# Patient Record
Sex: FEMALE | Race: WHITE | NOT HISPANIC OR LATINO | Employment: OTHER | URBAN - METROPOLITAN AREA
[De-identification: names, ages, dates, MRNs, and addresses within clinical notes are randomized per-mention and may not be internally consistent; named-entity substitution may affect disease eponyms.]

---

## 2017-05-12 ENCOUNTER — HOSPITAL ENCOUNTER (EMERGENCY)
Facility: HOSPITAL | Age: 47
Discharge: HOME | End: 2017-05-12
Attending: EMERGENCY MEDICINE
Payer: COMMERCIAL

## 2017-05-12 VITALS
BODY MASS INDEX: 21.45 KG/M2 | HEIGHT: 66 IN | OXYGEN SATURATION: 100 % | TEMPERATURE: 99 F | SYSTOLIC BLOOD PRESSURE: 112 MMHG | DIASTOLIC BLOOD PRESSURE: 62 MMHG | RESPIRATION RATE: 18 BRPM | HEART RATE: 92 BPM | WEIGHT: 133.5 LBS

## 2017-05-12 DIAGNOSIS — G89.4 CHRONIC PAIN SYNDROME: ICD-10-CM

## 2017-05-12 DIAGNOSIS — N39.0 URINARY TRACT INFECTION WITHOUT HEMATURIA, SITE UNSPECIFIED: Primary | ICD-10-CM

## 2017-05-12 LAB
ALBUMIN SERPL BCP-MCNC: 3.7 G/DL
ALP SERPL-CCNC: 96 U/L
ALT SERPL W/O P-5'-P-CCNC: <5 U/L
ANION GAP SERPL CALC-SCNC: 11 MMOL/L
AST SERPL-CCNC: 7 U/L
BACTERIA #/AREA URNS HPF: ABNORMAL /HPF
BASOPHILS # BLD AUTO: 0.02 K/UL
BASOPHILS NFR BLD: 0.3 %
BILIRUB SERPL-MCNC: 0.5 MG/DL
BILIRUB UR QL STRIP: ABNORMAL
BUN SERPL-MCNC: 4 MG/DL
CALCIUM SERPL-MCNC: 9.9 MG/DL
CHLORIDE SERPL-SCNC: 105 MMOL/L
CLARITY UR: CLEAR
CO2 SERPL-SCNC: 23 MMOL/L
COLOR UR: YELLOW
CREAT SERPL-MCNC: 0.7 MG/DL
DIFFERENTIAL METHOD: ABNORMAL
EOSINOPHIL # BLD AUTO: 0 K/UL
EOSINOPHIL NFR BLD: 0.4 %
ERYTHROCYTE [DISTWIDTH] IN BLOOD BY AUTOMATED COUNT: 13.3 %
EST. GFR  (AFRICAN AMERICAN): >60 ML/MIN/1.73 M^2
EST. GFR  (NON AFRICAN AMERICAN): >60 ML/MIN/1.73 M^2
GLUCOSE SERPL-MCNC: 129 MG/DL
GLUCOSE UR QL STRIP: NEGATIVE
HCT VFR BLD AUTO: 32.9 %
HGB BLD-MCNC: 11.4 G/DL
HGB UR QL STRIP: ABNORMAL
KETONES UR QL STRIP: NEGATIVE
LEUKOCYTE ESTERASE UR QL STRIP: ABNORMAL
LYMPHOCYTES # BLD AUTO: 0.9 K/UL
LYMPHOCYTES NFR BLD: 11.1 %
MCH RBC QN AUTO: 32.9 PG
MCHC RBC AUTO-ENTMCNC: 34.7 %
MCV RBC AUTO: 95 FL
MICROSCOPIC COMMENT: ABNORMAL
MONOCYTES # BLD AUTO: 0.7 K/UL
MONOCYTES NFR BLD: 8.4 %
NEUTROPHILS # BLD AUTO: 6.3 K/UL
NEUTROPHILS NFR BLD: 79.8 %
NITRITE UR QL STRIP: POSITIVE
PH UR STRIP: 6 [PH] (ref 5–8)
PLATELET # BLD AUTO: 245 K/UL
PMV BLD AUTO: 11.1 FL
POTASSIUM SERPL-SCNC: 3.4 MMOL/L
PROT SERPL-MCNC: 6.6 G/DL
PROT UR QL STRIP: ABNORMAL
RBC # BLD AUTO: 3.47 M/UL
RBC #/AREA URNS HPF: 6 /HPF (ref 0–4)
SODIUM SERPL-SCNC: 139 MMOL/L
SP GR UR STRIP: 1.01 (ref 1–1.03)
URN SPEC COLLECT METH UR: ABNORMAL
UROBILINOGEN UR STRIP-ACNC: NEGATIVE EU/DL
WBC # BLD AUTO: 7.83 K/UL
WBC #/AREA URNS HPF: 5 /HPF (ref 0–5)

## 2017-05-12 PROCEDURE — 96375 TX/PRO/DX INJ NEW DRUG ADDON: CPT

## 2017-05-12 PROCEDURE — 87186 SC STD MICRODIL/AGAR DIL: CPT

## 2017-05-12 PROCEDURE — 81000 URINALYSIS NONAUTO W/SCOPE: CPT

## 2017-05-12 PROCEDURE — 87086 URINE CULTURE/COLONY COUNT: CPT

## 2017-05-12 PROCEDURE — 63600175 PHARM REV CODE 636 W HCPCS: Performed by: EMERGENCY MEDICINE

## 2017-05-12 PROCEDURE — 80053 COMPREHEN METABOLIC PANEL: CPT

## 2017-05-12 PROCEDURE — 87077 CULTURE AEROBIC IDENTIFY: CPT

## 2017-05-12 PROCEDURE — 99284 EMERGENCY DEPT VISIT MOD MDM: CPT | Mod: 25

## 2017-05-12 PROCEDURE — 96365 THER/PROPH/DIAG IV INF INIT: CPT

## 2017-05-12 PROCEDURE — 85025 COMPLETE CBC W/AUTO DIFF WBC: CPT

## 2017-05-12 PROCEDURE — 87088 URINE BACTERIA CULTURE: CPT

## 2017-05-12 RX ORDER — CEFUROXIME AXETIL 500 MG/1
500 TABLET ORAL 2 TIMES DAILY
Qty: 14 TABLET | Refills: 0 | Status: SHIPPED | OUTPATIENT
Start: 2017-05-12 | End: 2017-05-19

## 2017-05-12 RX ORDER — TRAMADOL HYDROCHLORIDE 50 MG/1
50 TABLET ORAL EVERY 6 HOURS PRN
Qty: 12 TABLET | Refills: 0 | Status: SHIPPED | OUTPATIENT
Start: 2017-05-12 | End: 2017-05-22

## 2017-05-12 RX ORDER — MORPHINE SULFATE 4 MG/ML
4 INJECTION, SOLUTION INTRAMUSCULAR; INTRAVENOUS
Status: COMPLETED | OUTPATIENT
Start: 2017-05-12 | End: 2017-05-12

## 2017-05-12 RX ORDER — ONDANSETRON 2 MG/ML
4 INJECTION INTRAMUSCULAR; INTRAVENOUS
Status: COMPLETED | OUTPATIENT
Start: 2017-05-12 | End: 2017-05-12

## 2017-05-12 RX ADMIN — MORPHINE SULFATE 4 MG: 4 INJECTION, SOLUTION INTRAMUSCULAR; INTRAVENOUS at 12:05

## 2017-05-12 RX ADMIN — CEFTRIAXONE 1 G: 1 INJECTION, SOLUTION INTRAVENOUS at 12:05

## 2017-05-12 RX ADMIN — ONDANSETRON 4 MG: 2 INJECTION INTRAMUSCULAR; INTRAVENOUS at 12:05

## 2017-05-12 NOTE — ED PROVIDER NOTES
SCRIBE #1 NOTE: I, Cece Argueta, am scribing for, and in the presence of, Radha Schaefer MD. I have scribed the entire note.      History      Chief Complaint   Patient presents with    Flank Pain     R sided flank and upper quadrant abd pain x 4 days        Review of patient's allergies indicates:   Allergen Reactions    Prozac [fluoxetine] Other (See Comments)     Pt states she is intolerant of all antidepressants-makes her forget who or where she is    Adhesive tape-silicones Dermatitis     Skin sluffs off    Powder scent fragrance      Powder from gloves        HPI   HPI    5/12/2017, 12:29 PM   History obtained from the patient      History of Present Illness: Mary Jo Chan is a 46 y.o. female patient who presents to the Emergency Department for R sided flank pain associated with RUQ abd pain which onset gradually 4 days ago. Symptoms are constant and moderate in severity. Pt reports that her R side is tender to touch. Pt reports that when she lays on her L side the pain is unbearable so she lays on her R side to try to get comfortable when she sleeps. No other associated sxs reported. Patient denies any HA, fever, nausea, vomiting, diarrhea, vaginal bleeding, vaginal discharge, dysuria, hematochezia, hematuria, and all other sxs at this time. Pt reports that she had dysuria a few days ago but reports sxs have resolved. Pt reports that she PSHx appendectomy, cholecystectomy, and hysterectomy. No further complaints or concerns at this time.     Arrival mode: Personal vehicle     PCP: Levi Guzman MD       Past Medical History:  Past Medical History:   Diagnosis Date    Anxiety     with severe panic attacks    Chronic fatigue     Depression     Disc degeneration     Disc degeneration     Diverticulosis     Herniated disc     IBS (irritable bowel syndrome)     IgG deficiency     IgG deficiency     MS (multiple sclerosis)     Multiple sclerosis     Osteoarthritis     Other chronic  sinusitis     Panic attack        Past Surgical History:  Past Surgical History:   Procedure Laterality Date    BLADDER SURGERY      multiple repairs after tears during ex lap     SECTION, LOW TRANSVERSE      CYSTOTOMY      with repair at time of     FOOT NEUROMA SURGERY      HYSTERECTOMY      MT EXPLORATORY OF ABDOMEN      Multiple with lysis of adhesions    SHOULDER SURGERY      right         Family History:  Family History   Problem Relation Age of Onset    Colon cancer Mother 30       Social History:  Social History     Social History Main Topics    Smoking status: Current Some Day Smoker     Packs/day: 0.50     Years: 27.00     Types: Cigarettes    Smokeless tobacco: Not on file    Alcohol use No    Drug use: No    Sexual activity: Not on file       ROS   Review of Systems   Constitutional: Negative for chills and fever.   HENT: Negative for sore throat and trouble swallowing.    Respiratory: Negative for cough and shortness of breath.    Cardiovascular: Negative for chest pain.   Gastrointestinal: Positive for abdominal pain. Negative for constipation, diarrhea, nausea and vomiting.        (-) hematochezia   Genitourinary: Positive for flank pain (R sided). Negative for dysuria, frequency, hematuria, vaginal bleeding and vaginal discharge.   Musculoskeletal: Negative for back pain.   Skin: Negative for rash and wound.   Neurological: Negative for weakness, numbness and headaches.   Hematological: Does not bruise/bleed easily.   All other systems reviewed and are negative.    Physical Exam    Initial Vitals   BP Pulse Resp Temp SpO2   17 1136 17 1136 17 1136 17 1136 17 1136   119/70 106 18 98.6 °F (37 °C) 96 %      Physical Exam  Nursing Notes and Vital Signs Reviewed.  Constitutional: Patient is in no apparent distress. Awake and alert. Well-developed and well-nourished.  Head: Atraumatic. Normocephalic.  Eyes: PERRL. EOM intact. Conjunctivae are not  "pale. No scleral icterus.  ENT: Mucous membranes are moist. Oropharynx is clear and symmetric.    Neck: Supple. Full ROM. No lymphadenopathy.  Cardiovascular: Regular rate. Regular rhythm. No murmurs, rubs, or gallops. Distal pulses are 2+ and symmetric.  Pulmonary/Chest: No respiratory distress. Clear to auscultation bilaterally. No wheezing, rales, or rhonchi.  Abdominal: Soft and non-distended.  There is no tenderness.  No rebound, guarding, or rigidity. Good bowel sounds.  Genitourinary: Right CVA tenderness  Musculoskeletal: Moves all extremities. No obvious deformities. No edema. No calf tenderness.  Skin: Warm and dry.  Neurological:  Alert, awake, and appropriate.  Normal speech.  No acute focal neurological deficits are appreciated.  Psychiatric: Normal affect. Good eye contact. Appropriate in content.    ED Course    Procedures  ED Vital Signs:  Vitals:    05/12/17 1136 05/12/17 1145 05/12/17 1155 05/12/17 1200   BP: 119/70 109/68 108/67 102/72   Pulse: 106 100 97 94   Resp: 18 20 17 (!) 21   Temp: 98.6 °F (37 °C)      TempSrc: Oral      SpO2: 96% 98% 98% 97%   Weight: 60.6 kg (133 lb 8 oz)      Height: 5' 6" (1.676 m)       05/12/17 1224 05/12/17 1236 05/12/17 1237 05/12/17 1313   BP: 99/63 102/62  112/62   Pulse: 90 92  92   Resp: 16 16  18   Temp:   98.7 °F (37.1 °C) 98.7 °F (37.1 °C)   TempSrc:   Oral    SpO2: 97% 98%  100%   Weight:       Height:           Abnormal Lab Results:  Labs Reviewed   CBC W/ AUTO DIFFERENTIAL - Abnormal; Notable for the following:        Result Value    RBC 3.47 (*)     Hemoglobin 11.4 (*)     Hematocrit 32.9 (*)     MCH 32.9 (*)     Lymph # 0.9 (*)     Gran% 79.8 (*)     Lymph% 11.1 (*)     All other components within normal limits   COMPREHENSIVE METABOLIC PANEL - Abnormal; Notable for the following:     Potassium 3.4 (*)     Glucose 129 (*)     BUN, Bld 4 (*)     AST 7 (*)     ALT <5 (*)     All other components within normal limits   URINALYSIS - Abnormal; Notable for " the following:     Protein, UA Trace (*)     Bilirubin (UA) 1+ (*)     Occult Blood UA 3+ (*)     Nitrite, UA Positive (*)     Leukocytes, UA 2+ (*)     All other components within normal limits   URINALYSIS MICROSCOPIC - Abnormal; Notable for the following:     RBC, UA 6 (*)     All other components within normal limits   CULTURE, URINE   CULTURE, URINE        All Lab Results:  Results for orders placed or performed during the hospital encounter of 05/12/17   CBC auto differential   Result Value Ref Range    WBC 7.83 3.90 - 12.70 K/uL    RBC 3.47 (L) 4.00 - 5.40 M/uL    Hemoglobin 11.4 (L) 12.0 - 16.0 g/dL    Hematocrit 32.9 (L) 37.0 - 48.5 %    MCV 95 82 - 98 fL    MCH 32.9 (H) 27.0 - 31.0 pg    MCHC 34.7 32.0 - 36.0 %    RDW 13.3 11.5 - 14.5 %    Platelets 245 150 - 350 K/uL    MPV 11.1 9.2 - 12.9 fL    Gran # 6.3 1.8 - 7.7 K/uL    Lymph # 0.9 (L) 1.0 - 4.8 K/uL    Mono # 0.7 0.3 - 1.0 K/uL    Eos # 0.0 0.0 - 0.5 K/uL    Baso # 0.02 0.00 - 0.20 K/uL    Gran% 79.8 (H) 38.0 - 73.0 %    Lymph% 11.1 (L) 18.0 - 48.0 %    Mono% 8.4 4.0 - 15.0 %    Eosinophil% 0.4 0.0 - 8.0 %    Basophil% 0.3 0.0 - 1.9 %    Differential Method Automated    Comprehensive metabolic panel   Result Value Ref Range    Sodium 139 136 - 145 mmol/L    Potassium 3.4 (L) 3.5 - 5.1 mmol/L    Chloride 105 95 - 110 mmol/L    CO2 23 23 - 29 mmol/L    Glucose 129 (H) 70 - 110 mg/dL    BUN, Bld 4 (L) 6 - 20 mg/dL    Creatinine 0.7 0.5 - 1.4 mg/dL    Calcium 9.9 8.7 - 10.5 mg/dL    Total Protein 6.6 6.0 - 8.4 g/dL    Albumin 3.7 3.5 - 5.2 g/dL    Total Bilirubin 0.5 0.1 - 1.0 mg/dL    Alkaline Phosphatase 96 55 - 135 U/L    AST 7 (L) 10 - 40 U/L    ALT <5 (L) 10 - 44 U/L    Anion Gap 11 8 - 16 mmol/L    eGFR if African American >60 >60 mL/min/1.73 m^2    eGFR if non African American >60 >60 mL/min/1.73 m^2   Urinalysis Clean Catch   Result Value Ref Range    Specimen UA Urine, Clean Catch     Color, UA Yellow Yellow, Straw, Acacia    Appearance, UA Clear  Clear    pH, UA 6.0 5.0 - 8.0    Specific Gravity, UA 1.010 1.005 - 1.030    Protein, UA Trace (A) Negative    Glucose, UA Negative Negative    Ketones, UA Negative Negative    Bilirubin (UA) 1+ (A) Negative    Occult Blood UA 3+ (A) Negative    Nitrite, UA Positive (A) Negative    Urobilinogen, UA Negative <2.0 EU/dL    Leukocytes, UA 2+ (A) Negative   Urinalysis Microscopic   Result Value Ref Range    RBC, UA 6 (H) 0 - 4 /hpf    WBC, UA 5 0 - 5 /hpf    Bacteria, UA Occasional None-Occ /hpf    Microscopic Comment SEE COMMENT          Imaging Results:  Imaging Results         CT Renal Stone Study ABD Pelvis WO (Final result) Result time:  05/12/17 12:24:38    Final result by Lilliana Hanson III, MD (05/12/17 12:24:38)    Impression:           1.  No evidence of urolithiasis, hydronephrosis or other acute disease.        Electronically signed by: LILLIANA HANSON MD  Date:     05/12/17  Time:    12:24     Narrative:    CT RENAL STONE STUDY ABD PELVIS WO    Clinical Indication: right flank pain.      Technique:  Axial images through the abdomen and pelvis were obtained without IV contrast. All CT scans at this facility use dose modulation, iterative reconstruction, and/or weight based dosing when appropriate to reduce radiation dose to as low as reasonably achievable.    Comparison: prior abdominal CT 08/20/2013    FINDINGS: No acute disease is seen in the lung bases. No acute osseous abnormality or suspicious bone marrow lesion identified.       The kidneys and ureters are unremarkable for noncontrast technique without evidence of urolithiasis or obstructive uropathy. The bladder is within normal limits.    There is a moderate amount of retained stool and gas scattered throughout the colon.  There is no evidence of bowel obstruction, acute inflammatory process or other acute bowel pathology. The stomach is within normal limits.  The appendix is surgically absent.  The liver, pancreas, adrenals and spleen are unremarkable  for noncontrast technique. Cholecystectomy changes are noted. No aortic aneurysm identified. No intra-abdominal or intrapelvic inflammatory changes are demonstrated. No free intraperitoneal fluid or free air identified. No pathologically enlarged lymph nodes are identified.                      The Emergency Provider reviewed the vital signs and test results, which are outlined above.    ED Discussion   12:40 PM: Discussed with pt all pertinent ED information and results. Discussed pt dx and plan of tx. Gave pt all f/u and return to the ED instructions. All questions and concerns were addressed at this time. Pt expresses understanding of information and instructions, and is comfortable with plan to discharge. Pt is stable for discharge.    I discussed with patient and/or family/caretaker that evaluation in the ED does not suggest any emergent or life threatening medical conditions requiring immediate intervention beyond what was provided in the ED, and I believe patient is safe for discharge.  Regardless, an unremarkable evaluation in the ED does not preclude the development or presence of a serious of life threatening condition. As such, patient was instructed to return immediately for any worsening or change in current symptoms.        ED Medication(s):  Medications   morphine injection 4 mg (4 mg Intravenous Given 5/12/17 1220)   ondansetron injection 4 mg (4 mg Intravenous Given 5/12/17 1220)   cefTRIAXone (ROCEPHIN) 1 g in dextrose 5 % 50 mL IVPB (0 g Intravenous Stopped 5/12/17 1313)       Discharge Medication List as of 5/12/2017 12:38 PM      START taking these medications    Details   cefUROXime (CEFTIN) 500 MG tablet Take 1 tablet (500 mg total) by mouth 2 (two) times daily., Starting 5/12/2017, Until Fri 5/19/17, Print      tramadol (ULTRAM) 50 mg tablet Take 1 tablet (50 mg total) by mouth every 6 (six) hours as needed for Pain., Starting 5/12/2017, Until Mon 5/22/17, Print             Follow-up  Information     Follow up with Levi Guzman MD. Schedule an appointment as soon as possible for a visit in 2 days.    Specialty:  Pediatrics    Why:  Return to the Emergency Room, If symptoms worsen    Contact information:    [address] [address]  [phone]              Medical Decision Making    Medical Decision Making:   Clinical Tests:   Lab Tests: Ordered and Reviewed  Radiological Study: Ordered and Reviewed           Scribe Attestation:   Scribe #1: I performed the above scribed service and the documentation accurately describes the services I performed. I attest to the accuracy of the note.    Attending:   Physician Attestation Statement for Scribe #1: I, Radha Schaefer MD, personally performed the services described in this documentation, as scribed by Cece Argueta, in my presence, and it is both accurate and complete.          Clinical Impression       ICD-10-CM ICD-9-CM   1. Urinary tract infection without hematuria, site unspecified N39.0 599.0   2. Chronic pain syndrome G89.4 338.4       Disposition:   Disposition: Discharged  Condition: Stable         Radha Schaefer MD  05/12/17 133

## 2017-05-12 NOTE — ED AVS SNAPSHOT
[facility] -   [address]  [place] [address]               April VIKTORIA Benderfariba   2017 11:38 AM   ED    Description:  Female : 1970   Department:  [facility] -            Your Care was Coordinated By:     Provider Role From To    Radha Schaefer MD Attending Provider 17 1143 --      Reason for Visit     Flank Pain           Diagnoses this Visit        Comments    Urinary tract infection without hematuria, site unspecified    -  Primary     Chronic pain syndrome           ED Disposition     None           To Do List           Follow-up Information     Follow up with Levi Guzman MD. Schedule an appointment as soon as possible for a visit in 2 days.    Specialty:  Pediatrics    Why:  Return to the Emergency Room, If symptoms worsen    Contact information:    [address] [place] [address]  [phone]         These Medications        Disp Refills Start End    cefUROXime (CEFTIN) 500 MG tablet 14 tablet 0 2017    Take 1 tablet (500 mg total) by mouth 2 (two) times daily. - Oral    Pharmacy: [facility] [address] [place] [address] Ph #: [phone]       tramadol (ULTRAM) 50 mg tablet 12 tablet 0 2017    Take 1 tablet (50 mg total) by mouth every 6 (six) hours as needed for Pain. - Oral    Pharmacy: [facility] [address] [place] [address] Ph #: [phone]         [facility]     [facility] -  Assistance  Unless otherwise directed by your provider, please contact [facility], our nurse care line that is available for 24/ assistance.     Registered nurses in the [facility] provide: appointment scheduling, clinical advisement, health education, and other advisory services.  Call: [phone] (toll free)               Medications           Message regarding Medications     Verify the  changes and/or additions to your medication regime listed below are the same as discussed with your clinician today.  If any of these changes or additions are incorrect, please notify your healthcare provider.        START taking these NEW medications        Refills    cefUROXime (CEFTIN) 500 MG tablet 0    Sig: Take 1 tablet (500 mg total) by mouth 2 (two) times daily.    Class: Print    Route: Oral    tramadol (ULTRAM) 50 mg tablet 0    Sig: Take 1 tablet (50 mg total) by mouth every 6 (six) hours as needed for Pain.    Class: Print    Route: Oral      These medications were administered today        Dose Freq    morphine injection 4 mg 4 mg ED 1 Time    Sig: Inject 1 mL (4 mg total) into the vein ED 1 Time.    Class: Normal    Route: Intravenous    ondansetron injection 4 mg 4 mg ED 1 Time    Sig: Inject 4 mg into the vein ED 1 Time.    Class: Normal    Route: Intravenous    cefTRIAXone (ROCEPHIN) 1 g in dextrose 5 % 50 mL IVPB 1 g ED 1 Time    Sig: Inject 50 mLs (1 g total) into the vein ED 1 Time.    Class: Normal    Route: Intravenous           Verify that the below list of medications is an accurate representation of the medications you are currently taking.  If none reported, the list may be blank. If incorrect, please contact your healthcare provider. Carry this list with you in case of emergency.           Current Medications     alendronate-vitamin D3 (FOSAMAX PLUS D) 70-2,800 mg-unit per tablet Take 1 tablet by mouth every 7 days.    alprazolam (XANAX XR) 2 MG Tb24     cefTRIAXone (ROCEPHIN) 1 g in dextrose 5 % 50 mL IVPB Inject 50 mLs (1 g total) into the vein ED 1 Time.    cefUROXime (CEFTIN) 500 MG tablet Take 1 tablet (500 mg total) by mouth 2 (two) times daily.    codeine-butalbital-ASA-caffeine 37--40 mg Cap Take 1 capsule by mouth every 4 (four) hours as needed.    dextroamphetamine-amphetamine 10 mg Tab Take by mouth.    dimethyl fumarate (TECFIDERA) 240 mg CpDR Take 240 mg by mouth 2 (two)  "times daily.    indomethacin (INDOCIN) 50 MG capsule Take 50 mg by mouth 3 (three) times daily.    lamotrigine (LAMICTAL) 100 MG tablet Take 100 mg by mouth once daily.    ondansetron (ZOFRAN-ODT) 8 MG TbDL     tramadol (ULTRAM) 50 mg tablet Take 1 tablet (50 mg total) by mouth every 6 (six) hours as needed for Pain.    zolpidem (AMBIEN) 10 mg Tab Take 5 mg by mouth nightly as needed.           Clinical Reference Information           Your Vitals Were     BP Pulse Temp Resp Height Weight    99/63 92 98.7 °F (37.1 °C) (Oral) 16 5' 6" (1.676 m) 60.6 kg (133 lb 8 oz)    SpO2 BMI             98% 21.55 kg/m2         Allergies as of 5/12/2017        Reactions    Prozac [Fluoxetine] Other (See Comments)    Pt states she is intolerant of all antidepressants-makes her forget who or where she is    Adhesive Tape-silicones Dermatitis    Skin sluffs off    Powder Scent Fragrance     Powder from gloves      Immunizations Administered on Date of Encounter - 5/12/2017     None      ED Micro, Lab, POCT     Start Ordered       Status Ordering Provider    05/12/17 1227 05/12/17 1226  Urine culture  Add-on      Completed     05/12/17 1157 05/12/17 1156  CBC auto differential  STAT      Final result     05/12/17 1157 05/12/17 1156  Comprehensive metabolic panel  STAT      In process     05/12/17 1157 05/12/17 1156  Urinalysis Clean Catch  STAT      Final result     05/12/17 1156 05/12/17 1156  Urinalysis Microscopic  Once      Final result       ED Imaging Orders     Start Ordered       Status Ordering Provider    05/12/17 1157 05/12/17 1156  CT Renal Stone Study ABD Pelvis WO  1 time imaging      Final result       Discharge References/Attachments     BLADDER INFECTION, FEMALE (ADULT) (ENGLISH)    URINARY TRACT INFECTIONS IN WOMEN (ENGLISH)       [facility] -  complies with applicable Federal civil rights laws and does not discriminate on the basis of race, color, national origin, age, disability, or sex.        Language " Assistance Services     ATTENTION: Language assistance services are available, free of charge. Please call [phone].      ATENCIÓN: Si habla español, tiene a henderson disposición servicios gratuitos de asistencia lingüística. Llame al [phone].     CHÚ Ý: N?u b?n nói Ti?ng Vi?t, có các d?ch v? h? tr? ngôn ng? mi?n phí dành cho b?n. G?i s? [phone].

## 2017-05-15 LAB — BACTERIA UR CULT: NORMAL

## 2018-12-29 ENCOUNTER — HOSPITAL ENCOUNTER (EMERGENCY)
Facility: HOSPITAL | Age: 48
Discharge: HOME | End: 2018-12-29
Attending: EMERGENCY MEDICINE
Payer: COMMERCIAL

## 2018-12-29 VITALS
RESPIRATION RATE: 18 BRPM | DIASTOLIC BLOOD PRESSURE: 88 MMHG | HEART RATE: 109 BPM | BODY MASS INDEX: 31.46 KG/M2 | OXYGEN SATURATION: 98 % | WEIGHT: 188.81 LBS | SYSTOLIC BLOOD PRESSURE: 149 MMHG | TEMPERATURE: 98 F | HEIGHT: 65 IN

## 2018-12-29 DIAGNOSIS — R21 RASH OF GENITAL AREA: Primary | ICD-10-CM

## 2018-12-29 PROCEDURE — 99283 EMERGENCY DEPT VISIT LOW MDM: CPT

## 2018-12-29 PROCEDURE — 87529 HSV DNA AMP PROBE: CPT

## 2018-12-29 RX ORDER — HYDROCODONE BITARTRATE AND ACETAMINOPHEN 5; 325 MG/1; MG/1
1 TABLET ORAL EVERY 4 HOURS PRN
Qty: 11 TABLET | Refills: 0 | Status: SHIPPED | OUTPATIENT
Start: 2018-12-29 | End: 2019-01-08

## 2018-12-29 RX ORDER — HYDROCODONE BITARTRATE AND ACETAMINOPHEN 5; 325 MG/1; MG/1
1 TABLET ORAL EVERY 4 HOURS PRN
Qty: 11 TABLET | Refills: 0 | Status: SHIPPED | OUTPATIENT
Start: 2018-12-29 | End: 2018-12-29 | Stop reason: SDUPTHER

## 2018-12-29 RX ORDER — VALACYCLOVIR HYDROCHLORIDE 1 G/1
1000 TABLET, FILM COATED ORAL 3 TIMES DAILY
Qty: 21 TABLET | Refills: 0 | Status: SHIPPED | OUTPATIENT
Start: 2018-12-29 | End: 2020-06-24

## 2018-12-29 NOTE — ED PROVIDER NOTES
SCRIBE #1 NOTE: I, Corinne Mack, am scribing for, and in the presence of, Krystian Laureano MD. I have scribed the entire note.      History      Chief Complaint   Patient presents with    Groin Swelling     reports to left side vagina x 2 days       Review of patient's allergies indicates:   Allergen Reactions    Prozac [fluoxetine] Other (See Comments)     Pt states she is intolerant of all antidepressants-makes her forget who or where she is    Adhesive tape-silicones Dermatitis     Skin sluffs off    Powder scent fragrance      Powder from gloves        HPI   HPI    2018, 2:19 PM   History obtained from the patient      History of Present Illness: April VIKTORIA Chan is a 47 y.o. female patient with PMHx of anxiety and MS who presents to the Emergency Department for L sided vaginal swelling which onset gradually 2 days ago. Symptoms are constant and moderate in severity. No mitigating or exacerbating factors reported. Associated sxs include vaginal pain. Patient denies any fever, chills, cough, N/V, abd pain, back pain, vaginal d/c, vaginal bleeding, pelvic pain, dysuria, hematuria, HA, and all other sxs at this time. No prior Tx reported. No further complaints or concerns at this time.         Arrival mode: Personal vehicle    PCP: Levi Guzman MD       Past Medical History:  Past Medical History:   Diagnosis Date    Anxiety     with severe panic attacks    Chronic fatigue     Depression     Disc degeneration     Disc degeneration     Diverticulosis     Herniated disc     IBS (irritable bowel syndrome)     IgG deficiency     IgG deficiency     MS (multiple sclerosis)     Multiple sclerosis     Osteoarthritis     Other chronic sinusitis     Panic attack        Past Surgical History:  Past Surgical History:   Procedure Laterality Date    BLADDER SURGERY      multiple repairs after tears during ex lap     SECTION, LOW TRANSVERSE      COLONOSCOPY N/A 2013    Performed by  [facility]    CYSTOTOMY      with repair at time of     EGD (ESOPHAGOGASTRODUODENOSCOPY) N/A 2013    Performed by Prakash Oliver [facility] at [facility]    FOOT NEUROMA SURGERY      HYSTERECTOMY      [place] EXPLORATORY OF ABDOMEN      Multiple with lysis of adhesions    SHOULDER SURGERY      right         Family History:  Family History   Problem Relation Age of Onset    Colon cancer Mother 30       Social History:  Social History     Tobacco Use    Smoking status: Current Some Day Smoker     Packs/day: 0.50     Years: 27.00     Pack years: 13.50     Types: Cigarettes   Substance and Sexual Activity    Alcohol use: No    Drug use: No    Sexual activity: Unknown       ROS   Review of Systems   Constitutional: Negative for chills and fever.   Respiratory: Negative for cough and shortness of breath.    Cardiovascular: Negative for chest pain and leg swelling.   Gastrointestinal: Negative for abdominal pain, diarrhea, nausea and vomiting.   Genitourinary: Positive for vaginal pain. Negative for dysuria, hematuria, pelvic pain, vaginal bleeding and vaginal discharge.        (+) vaginal swelling   Musculoskeletal: Negative for back pain, neck pain and neck stiffness.   Skin: Negative for rash and wound.   Neurological: Negative for dizziness, light-headedness, numbness and headaches.   All other systems reviewed and are negative.    Physical Exam      Initial Vitals [18 1415]   BP Pulse Resp Temp SpO2   (!) 149/88 109 18 98 °F (36.7 °C) 98 %      MAP       --          Physical Exam  Nursing Notes and Vital Signs Reviewed.  Constitutional: Patient is in no acute distress. Well-developed and well-nourished.  Head: Atraumatic. Normocephalic.  Eyes: PERRL. EOM intact. Conjunctivae are not pale. No scleral icterus.  ENT: Mucous membranes are moist. Oropharynx is clear and symmetric.    Neck: Supple. Full ROM. No lymphadenopathy.  Cardiovascular: Regular rate. Regular rhythm. No  "murmurs, rubs, or gallops. Distal pulses are 2+ and symmetric.  Pulmonary/Chest: No respiratory distress. Clear to auscultation bilaterally. No wheezing or rales.  Abdominal: Soft and non-distended.  There is no tenderness.  No rebound, guarding, or rigidity.   Pelvic: A female chaperone was present for this examination. Ulcers with an erythematous base noted to the L vulva and L inner thigh. Cervical os is closed. There is no CMT. There is no blood in the vaginal vault. No discharge. No adnexal tenderness. No adnexal masses.  Musculoskeletal: Moves all extremities. No obvious deformities. No edema.   Skin: Warm and dry.  Neurological:  Alert, awake, and appropriate.  Normal speech.  No acute focal neurological deficits are appreciated.  Psychiatric: Normal affect. Good eye contact. Appropriate in content.    ED Course    Procedures  ED Vital Signs:  Vitals:    12/29/18 1415   BP: (!) 149/88   Pulse: 109   Resp: 18   Temp: 98 °F (36.7 °C)   TempSrc: Oral   SpO2: 98%   Weight: 85.6 kg (188 lb 13.2 oz)   Height: 5' 5" (1.651 m)       Abnormal Lab Results:  Labs Reviewed   HERPES SIMPLEX (HSV) BY RAPID PCR, NON-BLOOD        All Lab Results:  Results for orders placed or performed during the hospital encounter of 05/12/17   Urine culture   Result Value Ref Range    Urine Culture, Routine ESCHERICHIA COLI  >100,000 cfu/ml          Susceptibility    Escherichia coli - CULTURE, URINE     Amp/Sulbactam <=8/4 Sensitive mcg/mL     Ampicillin <=8 Sensitive mcg/mL     Amox/K Clav'ate <=8/4 Sensitive mcg/mL     Ceftriaxone <=8 Sensitive mcg/mL     Cefazolin <=8 Sensitive mcg/mL     Ciprofloxacin <=1 Sensitive mcg/mL     Cefepime <=8 Sensitive mcg/mL     Ertapenem <=2 Sensitive mcg/mL     Nitrofurantoin <=32 Sensitive mcg/mL     Gentamicin <=4 Sensitive mcg/mL     Meropenem <=4 Sensitive mcg/mL     Piperacillin/Tazo <=16 Sensitive mcg/mL     Trimeth/Sulfa <=2/38 Sensitive mcg/mL     Tetracycline <=4 Sensitive mcg/mL     " Tobramycin <=4 Sensitive mcg/mL   CBC auto differential   Result Value Ref Range    WBC 7.83 3.90 - 12.70 K/uL    RBC 3.47 (L) 4.00 - 5.40 M/uL    Hemoglobin 11.4 (L) 12.0 - 16.0 g/dL    Hematocrit 32.9 (L) 37.0 - 48.5 %    MCV 95 82 - 98 fL    MCH 32.9 (H) 27.0 - 31.0 pg    MCHC 34.7 32.0 - 36.0 %    RDW 13.3 11.5 - 14.5 %    Platelets 245 150 - 350 K/uL    MPV 11.1 9.2 - 12.9 fL    Gran # (ANC) 6.3 1.8 - 7.7 K/uL    Lymph # 0.9 (L) 1.0 - 4.8 K/uL    Mono # 0.7 0.3 - 1.0 K/uL    Eos # 0.0 0.0 - 0.5 K/uL    Baso # 0.02 0.00 - 0.20 K/uL    Gran% 79.8 (H) 38.0 - 73.0 %    Lymph% 11.1 (L) 18.0 - 48.0 %    Mono% 8.4 4.0 - 15.0 %    Eosinophil% 0.4 0.0 - 8.0 %    Basophil% 0.3 0.0 - 1.9 %    Differential Method Automated    Comprehensive metabolic panel   Result Value Ref Range    Sodium 139 136 - 145 mmol/L    Potassium 3.4 (L) 3.5 - 5.1 mmol/L    Chloride 105 95 - 110 mmol/L    CO2 23 23 - 29 mmol/L    Glucose 129 (H) 70 - 110 mg/dL    BUN, Bld 4 (L) 6 - 20 mg/dL    Creatinine 0.7 0.5 - 1.4 mg/dL    Calcium 9.9 8.7 - 10.5 mg/dL    Total Protein 6.6 6.0 - 8.4 g/dL    Albumin 3.7 3.5 - 5.2 g/dL    Total Bilirubin 0.5 0.1 - 1.0 mg/dL    Alkaline Phosphatase 96 55 - 135 U/L    AST 7 (L) 10 - 40 U/L    ALT <5 (L) 10 - 44 U/L    Anion Gap 11 8 - 16 mmol/L    eGFR if African American >60 >60 mL/min/1.73 m^2    eGFR if non African American >60 >60 mL/min/1.73 m^2   Urinalysis Clean Catch   Result Value Ref Range    Specimen UA Urine, Clean Catch     Color, UA Yellow Yellow, Straw, Acacia    Appearance, UA Clear Clear    pH, UA 6.0 5.0 - 8.0    Specific Gravity, UA 1.010 1.005 - 1.030    Protein, UA Trace (A) Negative    Glucose, UA Negative Negative    Ketones, UA Negative Negative    Bilirubin (UA) 1+ (A) Negative    Occult Blood UA 3+ (A) Negative    Nitrite, UA Positive (A) Negative    Urobilinogen, UA Negative <2.0 EU/dL    Leukocytes, UA 2+ (A) Negative   Urinalysis Microscopic   Result Value Ref Range    RBC, UA 6 (H) 0 -  4 /hpf    WBC, UA 5 0 - 5 /hpf    Bacteria, UA Occasional None-Occ /hpf    Microscopic Comment SEE COMMENT        Imaging Results:  Imaging Results    None                 The Emergency Provider reviewed the vital signs and test results, which are outlined above.    ED Discussion     3:08 PM: Reassessed pt at this time. Pt is awake, alert, and in no distress. Discussed with pt all pertinent ED information and results. Discussed pt dx and plan of tx. Gave pt all f/u and return to the ED instructions. All questions and concerns were addressed at this time. Pt expresses understanding of information and instructions, and is comfortable with plan to discharge. Pt is stable for discharge.    I discussed with patient and/or family/caretaker that evaluation in the ED does not suggest any emergent or life threatening medical conditions requiring immediate intervention beyond what was provided in the ED, and I believe patient is safe for discharge.  Regardless, an unremarkable evaluation in the ED does not preclude the development or presence of a serious of life threatening condition. As such, patient was instructed to return immediately for any worsening or change in current symptoms.      ED Medication(s):  Medications - No data to display       Medication List      START taking these medications    valACYclovir 1000 MG tablet  Commonly known as:  VALTREX  Take 1 tablet (1,000 mg total) by mouth 3 (three) times daily. for 7 days        ASK your doctor about these medications    alendronate-vitamin D3 70 mg- 2,800 unit per tablet  Commonly known as:  FOSAMAX PLUS D     ALPRAZolam 2 MG Tb24  Commonly known as:  XANAX XR     codeine-butalbital-ASA-caffeine 02--40 mg Cap  Commonly known as:  BUTALBITAL COMPOUND W/CODEINE     dextroamphetamine-amphetamine 10 mg Tab     indomethacin 50 MG capsule  Commonly known as:  INDOCIN     lamoTRIgine 100 MG tablet  Commonly known as:  LAMICTAL     ondansetron 8 MG Tbdl  Commonly  known as:  ZOFRAN-ODT     TECFIDERA 240 mg Cpdr  Generic drug:  dimethyl fumarate     zolpidem 10 mg Tab  Commonly known as:  AMBIEN           Where to Get Your Medications      You can get these medications from any pharmacy    Bring a paper prescription for each of these medications  · valACYclovir 1000 MG tablet         Follow-up Information     Levi Guzman MD.    Specialty:  Pediatrics  Contact information:  [id number] [place] [id number] [phone]                     Medical Decision Making    Medical Decision Making:   Clinical Tests:   Lab Tests: Ordered and Reviewed     Additional MDM:   Smoking Cessation: The patient is a smoker. The patient was counseled on smoking cessation for: 3 minutes. The patient was counseled on tobacco related  health complications.        Scribe Attestation:   Scribe #1: I performed the above scribed service and the documentation accurately describes the services I performed. I attest to the accuracy of the note 12/29/2018 3:08 PM.    Attending:   Physician Attestation Statement for Scribe #1: I, Krystian Laureano MD, personally performed the services described in this documentation, as scribed by Corinne Mack, in my presence, and it is both accurate and complete.          Clinical Impression       ICD-10-CM ICD-9-CM   1. Rash of genital area R21 782.1       Disposition:   Disposition: Discharged  Condition: Stable           Krystian Laureano MD  12/29/18 1523

## 2019-01-02 LAB
HSV1 DNA SPEC QL NAA+PROBE: NEGATIVE
HSV2 DNA SPEC QL NAA+PROBE: NEGATIVE
SPECIMEN SOURCE: NORMAL

## 2019-08-27 ENCOUNTER — HOSPITAL ENCOUNTER (EMERGENCY)
Facility: HOSPITAL | Age: 49
Discharge: HOME | End: 2019-08-27
Attending: EMERGENCY MEDICINE
Payer: COMMERCIAL

## 2019-08-27 VITALS
DIASTOLIC BLOOD PRESSURE: 77 MMHG | OXYGEN SATURATION: 97 % | BODY MASS INDEX: 32.26 KG/M2 | SYSTOLIC BLOOD PRESSURE: 147 MMHG | WEIGHT: 200.75 LBS | TEMPERATURE: 98 F | HEIGHT: 66 IN | RESPIRATION RATE: 18 BRPM | HEART RATE: 88 BPM

## 2019-08-27 DIAGNOSIS — S39.012A STRAIN OF LUMBAR REGION, INITIAL ENCOUNTER: ICD-10-CM

## 2019-08-27 DIAGNOSIS — S60.052A CONTUSION OF LEFT LITTLE FINGER WITHOUT DAMAGE TO NAIL, INITIAL ENCOUNTER: Primary | ICD-10-CM

## 2019-08-27 PROCEDURE — 99284 EMERGENCY DEPT VISIT MOD MDM: CPT | Mod: 25

## 2019-08-27 PROCEDURE — 29130 APPL FINGER SPLINT STATIC: CPT | Mod: F1

## 2019-08-27 NOTE — ED PROVIDER NOTES
Encounter Date: 2019       History     Chief Complaint   Patient presents with    Fall     tripped over dog, c/o left pinky finger, back, left elbow, and left knee pain     48 year old female presents to the er for evaluation of lower back pain and left hand pain after fall PTA. Pt reports she was getting out of the recliner and stepped on her cats tail which startled her dog and made her fall. Pt reports landing on her left side causing pain to her lower back and pinky finger left hand.  Pt denies hitting head or loc. Pt reports pain is mild and constant and states pain is an aching pain. Pt denies fever, chest pain, shortness of breath, chills, abdominal pain, dysuria, saddle anesthesia, incontenice     The history is provided by the patient.     Review of patient's allergies indicates:   Allergen Reactions    Prozac [fluoxetine] Other (See Comments)     Pt states she is intolerant of all antidepressants-makes her forget who or where she is    Adhesive tape-silicones Dermatitis     Skin sluffs off    Powder scent fragrance      Powder from gloves     Past Medical History:   Diagnosis Date    Anxiety     with severe panic attacks    Chronic fatigue     Depression     Disc degeneration     Disc degeneration     Diverticulosis     Herniated disc     IBS (irritable bowel syndrome)     IgG deficiency     IgG deficiency     MS (multiple sclerosis)     Multiple sclerosis     Osteoarthritis     Other chronic sinusitis     Panic attack      Past Surgical History:   Procedure Laterality Date    BLADDER SURGERY      multiple repairs after tears during ex lap     SECTION, LOW TRANSVERSE      COLONOSCOPY N/A 2013    [facility]    CYSTOTOMY      with repair at time of     EGD (ESOPHAGOGASTRODUODENOSCOPY) N/A 2013    Performed by Prakash Oliver [facility] at [facility]O    FOOT NEUROMA SURGERY      HYSTERECTOMY      [place] EXPLORATORY OF ABDOMEN       Multiple with lysis of adhesions    SHOULDER SURGERY      right     Family History   Problem Relation Age of Onset    Colon cancer Mother 30     Social History     Tobacco Use    Smoking status: Current Some Day Smoker     Packs/day: 0.50     Years: 27.00     Pack years: 13.50     Types: Cigarettes   Substance Use Topics    Alcohol use: No    Drug use: No     Review of Systems   Constitutional: Negative for chills, fatigue and fever.   HENT: Negative for sore throat.    Respiratory: Negative for shortness of breath.    Cardiovascular: Negative for chest pain.   Gastrointestinal: Negative for nausea.   Genitourinary: Negative for dysuria.   Musculoskeletal: Positive for arthralgias and myalgias. Negative for back pain and gait problem.   Skin: Negative for rash.   Neurological: Negative for dizziness, weakness and light-headedness.   Hematological: Does not bruise/bleed easily.   Psychiatric/Behavioral: Negative for agitation.       Physical Exam     Initial Vitals [08/27/19 0834]   BP Pulse Resp Temp SpO2   (!) 147/77 88 18 98.4 °F (36.9 °C) 97 %      MAP       --         Physical Exam    Constitutional: She appears well-developed and well-nourished. She is not diaphoretic. No distress.   HENT:   Head: Normocephalic and atraumatic.   Right Ear: External ear normal.   Left Ear: External ear normal.   Mouth/Throat: Oropharynx is clear and moist.   Eyes: Pupils are equal, round, and reactive to light. Right eye exhibits no discharge. Left eye exhibits no discharge.   Neck: Normal range of motion. Neck supple.   Cardiovascular: Normal rate and regular rhythm.   Pulmonary/Chest: Breath sounds normal. No respiratory distress. She has no wheezes. She has no rhonchi. She has no rales.   Abdominal: Soft. Bowel sounds are normal. There is no tenderness.   Musculoskeletal: She exhibits tenderness (left lower lumbar paraspinal TTP. no bony tenderness. left hand fifth digit mild swelling. pt with slightly decreased range  "of motion. hand neurovascuarly intact. radial and ulnar pulses 2+.).   Pt able to ambulate without difficulty    Neurological: She is alert and oriented to person, place, and time. She has normal strength.   Skin: Skin is warm and dry. No rash noted. No erythema.         ED Course   Splint Application  Date/Time: 8/27/2019 9:19 AM  Performed by: Toni Zhou NP  Authorized by: Krystian Laureano MD   Consent Done: Yes  Consent: Verbal consent obtained.  Risks and benefits: risks, benefits and alternatives were discussed  Consent given by: patient  Patient understanding: patient states understanding of the procedure being performed  Patient consent: the patient's understanding of the procedure matches consent given  Patient identity confirmed: name  Time out: Immediately prior to procedure a "time out" was called to verify the correct patient, procedure, equipment, support staff and site/side marked as required.  Location details: left index finger  Splint type: static finger  Supplies used: aluminum splint  Post-procedure: The splinted body part was neurovascularly unchanged following the procedure.  Patient tolerance: Patient tolerated the procedure well with no immediate complications        Labs Reviewed - No data to display       Imaging Results          X-Ray Lumbar Spine Ap And Lateral (Final result)  Result time 08/27/19 09:04:41    Final result by Yousuf Campos MD (08/27/19 09:04:41)                 Impression:      The lumbar spine is intact and in good alignment with no acute findings demonstrated.      Electronically signed by: Yousuf Campos  Date:    08/27/2019  Time:    09:04             Narrative:    EXAMINATION:  XR LUMBAR SPINE AP AND LATERAL    CLINICAL HISTORY:  fall;    TECHNIQUE:  AP, lateral and spot images were performed of the lumbar spine.    COMPARISON:  January 4, 2016    FINDINGS:  The lumbar spine appears intact in good alignment no fracture or dislocation.  Some moderate " disc space narrowing at L5-S1 is present.                               X-Ray Hand 3 view Left (Final result)  Result time 08/27/19 09:06:22    Final result by Yousuf Campos MD (08/27/19 09:06:22)                 Impression:      Negative exam.      Electronically signed by: Yousuf Campos  Date:    08/27/2019  Time:    09:06             Narrative:    EXAMINATION:  XR HAND COMPLETE 3 VIEW LEFT    CLINICAL HISTORY:  fall;    TECHNIQUE:  Standard radiography performed.    COMPARISON:  None    FINDINGS:  Bone density and architecture are normal.  No acute findings.                                                      Clinical Impression:       ICD-10-CM ICD-9-CM   1. Contusion of left little finger without damage to nail, initial encounter S60.052A 923.3   2. Strain of lumbar region, initial encounter S39.012A 847.2                                Toni Zhou NP  08/27/19 4889

## 2020-01-31 ENCOUNTER — HOSPITAL ENCOUNTER (EMERGENCY)
Facility: HOSPITAL | Age: 50
Discharge: HOME | End: 2020-01-31
Attending: EMERGENCY MEDICINE
Payer: COMMERCIAL

## 2020-01-31 VITALS
BODY MASS INDEX: 28.48 KG/M2 | DIASTOLIC BLOOD PRESSURE: 66 MMHG | WEIGHT: 176.5 LBS | SYSTOLIC BLOOD PRESSURE: 111 MMHG | OXYGEN SATURATION: 100 % | HEART RATE: 80 BPM | RESPIRATION RATE: 20 BRPM | TEMPERATURE: 98 F

## 2020-01-31 DIAGNOSIS — R10.84 GENERALIZED ABDOMINAL PAIN: Primary | ICD-10-CM

## 2020-01-31 DIAGNOSIS — E87.6 HYPOKALEMIA: ICD-10-CM

## 2020-01-31 DIAGNOSIS — N30.01 ACUTE CYSTITIS WITH HEMATURIA: ICD-10-CM

## 2020-01-31 LAB
ALBUMIN SERPL BCP-MCNC: 4 G/DL (ref 3.5–5.2)
ALP SERPL-CCNC: 97 U/L (ref 55–135)
ALT SERPL W/O P-5'-P-CCNC: 8 U/L (ref 10–44)
ANION GAP SERPL CALC-SCNC: 13 MMOL/L (ref 8–16)
AST SERPL-CCNC: 9 U/L (ref 10–40)
BACTERIA #/AREA URNS HPF: ABNORMAL /HPF
BASOPHILS # BLD AUTO: 0.03 K/UL (ref 0–0.2)
BASOPHILS NFR BLD: 0.3 % (ref 0–1.9)
BILIRUB SERPL-MCNC: 0.7 MG/DL (ref 0.1–1)
BILIRUB UR QL STRIP: NEGATIVE
BUN SERPL-MCNC: 10 MG/DL (ref 6–20)
CALCIUM SERPL-MCNC: 10.2 MG/DL (ref 8.7–10.5)
CHLORIDE SERPL-SCNC: 103 MMOL/L (ref 95–110)
CLARITY UR: CLEAR
CO2 SERPL-SCNC: 23 MMOL/L (ref 23–29)
COLOR UR: YELLOW
CREAT SERPL-MCNC: 0.7 MG/DL (ref 0.5–1.4)
DIFFERENTIAL METHOD: ABNORMAL
EOSINOPHIL # BLD AUTO: 0 K/UL (ref 0–0.5)
EOSINOPHIL NFR BLD: 0.3 % (ref 0–8)
ERYTHROCYTE [DISTWIDTH] IN BLOOD BY AUTOMATED COUNT: 12.2 % (ref 11.5–14.5)
EST. GFR  (AFRICAN AMERICAN): >60 ML/MIN/1.73 M^2
EST. GFR  (NON AFRICAN AMERICAN): >60 ML/MIN/1.73 M^2
GLUCOSE SERPL-MCNC: 107 MG/DL (ref 70–110)
GLUCOSE UR QL STRIP: NEGATIVE
HCT VFR BLD AUTO: 37.2 % (ref 37–48.5)
HGB BLD-MCNC: 12.8 G/DL (ref 12–16)
HGB UR QL STRIP: ABNORMAL
IMM GRANULOCYTES # BLD AUTO: 0.09 K/UL (ref 0–0.04)
IMM GRANULOCYTES NFR BLD AUTO: 0.8 % (ref 0–0.5)
KETONES UR QL STRIP: ABNORMAL
LEUKOCYTE ESTERASE UR QL STRIP: ABNORMAL
LIPASE SERPL-CCNC: 3 U/L (ref 4–60)
LYMPHOCYTES # BLD AUTO: 0.6 K/UL (ref 1–4.8)
LYMPHOCYTES NFR BLD: 5.2 % (ref 18–48)
MCH RBC QN AUTO: 32.2 PG (ref 27–31)
MCHC RBC AUTO-ENTMCNC: 34.4 G/DL (ref 32–36)
MCV RBC AUTO: 94 FL (ref 82–98)
MICROSCOPIC COMMENT: ABNORMAL
MONOCYTES # BLD AUTO: 0.8 K/UL (ref 0.3–1)
MONOCYTES NFR BLD: 7.4 % (ref 4–15)
NEUTROPHILS # BLD AUTO: 9.8 K/UL (ref 1.8–7.7)
NEUTROPHILS NFR BLD: 86 % (ref 38–73)
NITRITE UR QL STRIP: POSITIVE
NRBC BLD-RTO: 0 /100 WBC
PH UR STRIP: 6 [PH] (ref 5–8)
PLATELET # BLD AUTO: 325 K/UL (ref 150–350)
PMV BLD AUTO: 12.1 FL (ref 9.2–12.9)
POTASSIUM SERPL-SCNC: 2.8 MMOL/L (ref 3.5–5.1)
PROT SERPL-MCNC: 7.1 G/DL (ref 6–8.4)
PROT UR QL STRIP: NEGATIVE
RBC # BLD AUTO: 3.97 M/UL (ref 4–5.4)
RBC #/AREA URNS HPF: 6 /HPF (ref 0–4)
SODIUM SERPL-SCNC: 139 MMOL/L (ref 136–145)
SP GR UR STRIP: 1.02 (ref 1–1.03)
SQUAMOUS #/AREA URNS HPF: 12 /HPF
URN SPEC COLLECT METH UR: ABNORMAL
UROBILINOGEN UR STRIP-ACNC: NEGATIVE EU/DL
WBC # BLD AUTO: 11.39 K/UL (ref 3.9–12.7)
WBC #/AREA URNS HPF: 18 /HPF (ref 0–5)

## 2020-01-31 PROCEDURE — 87086 URINE CULTURE/COLONY COUNT: CPT

## 2020-01-31 PROCEDURE — 99284 EMERGENCY DEPT VISIT MOD MDM: CPT | Mod: 25

## 2020-01-31 PROCEDURE — 96361 HYDRATE IV INFUSION ADD-ON: CPT

## 2020-01-31 PROCEDURE — 87186 SC STD MICRODIL/AGAR DIL: CPT

## 2020-01-31 PROCEDURE — 96366 THER/PROPH/DIAG IV INF ADDON: CPT

## 2020-01-31 PROCEDURE — 63600175 PHARM REV CODE 636 W HCPCS: Performed by: EMERGENCY MEDICINE

## 2020-01-31 PROCEDURE — 83690 ASSAY OF LIPASE: CPT

## 2020-01-31 PROCEDURE — 87077 CULTURE AEROBIC IDENTIFY: CPT

## 2020-01-31 PROCEDURE — 87088 URINE BACTERIA CULTURE: CPT

## 2020-01-31 PROCEDURE — 80053 COMPREHEN METABOLIC PANEL: CPT

## 2020-01-31 PROCEDURE — 81000 URINALYSIS NONAUTO W/SCOPE: CPT

## 2020-01-31 PROCEDURE — 96365 THER/PROPH/DIAG IV INF INIT: CPT

## 2020-01-31 PROCEDURE — 96375 TX/PRO/DX INJ NEW DRUG ADDON: CPT

## 2020-01-31 PROCEDURE — 36415 COLL VENOUS BLD VENIPUNCTURE: CPT

## 2020-01-31 PROCEDURE — 85025 COMPLETE CBC W/AUTO DIFF WBC: CPT

## 2020-01-31 PROCEDURE — 25000003 PHARM REV CODE 250: Performed by: EMERGENCY MEDICINE

## 2020-01-31 PROCEDURE — 96367 TX/PROPH/DG ADDL SEQ IV INF: CPT

## 2020-01-31 PROCEDURE — 25500020 PHARM REV CODE 255: Performed by: EMERGENCY MEDICINE

## 2020-01-31 RX ORDER — CEFUROXIME AXETIL 500 MG/1
500 TABLET ORAL 2 TIMES DAILY
Qty: 20 TABLET | Refills: 0 | Status: SHIPPED | OUTPATIENT
Start: 2020-01-31 | End: 2020-02-10

## 2020-01-31 RX ORDER — POTASSIUM CHLORIDE 7.45 MG/ML
10 INJECTION INTRAVENOUS
Status: COMPLETED | OUTPATIENT
Start: 2020-01-31 | End: 2020-01-31

## 2020-01-31 RX ORDER — ONDANSETRON 2 MG/ML
4 INJECTION INTRAMUSCULAR; INTRAVENOUS
Status: COMPLETED | OUTPATIENT
Start: 2020-01-31 | End: 2020-01-31

## 2020-01-31 RX ORDER — OMEPRAZOLE 20 MG/1
20 CAPSULE, DELAYED RELEASE ORAL DAILY
Qty: 30 CAPSULE | Refills: 1 | Status: SHIPPED | OUTPATIENT
Start: 2020-01-31 | End: 2020-06-24

## 2020-01-31 RX ORDER — MORPHINE SULFATE 4 MG/ML
4 INJECTION, SOLUTION INTRAMUSCULAR; INTRAVENOUS
Status: COMPLETED | OUTPATIENT
Start: 2020-01-31 | End: 2020-01-31

## 2020-01-31 RX ORDER — BACLOFEN 10 MG/1
10 TABLET ORAL 3 TIMES DAILY
COMMUNITY

## 2020-01-31 RX ORDER — MELOXICAM 7.5 MG/1
7.5 TABLET ORAL DAILY
COMMUNITY

## 2020-01-31 RX ORDER — POTASSIUM CHLORIDE 20 MEQ/15ML
20 SOLUTION ORAL
Status: COMPLETED | OUTPATIENT
Start: 2020-01-31 | End: 2020-01-31

## 2020-01-31 RX ORDER — POTASSIUM CHLORIDE 20 MEQ/1
20 TABLET, EXTENDED RELEASE ORAL DAILY
Qty: 7 TABLET | Refills: 0 | Status: SHIPPED | OUTPATIENT
Start: 2020-01-31 | End: 2020-01-31 | Stop reason: SDUPTHER

## 2020-01-31 RX ORDER — PROCHLORPERAZINE EDISYLATE 5 MG/ML
10 INJECTION INTRAMUSCULAR; INTRAVENOUS
Status: COMPLETED | OUTPATIENT
Start: 2020-01-31 | End: 2020-01-31

## 2020-01-31 RX ORDER — OXYCODONE AND ACETAMINOPHEN 10; 325 MG/1; MG/1
1 TABLET ORAL EVERY 4 HOURS PRN
COMMUNITY
End: 2020-06-25

## 2020-01-31 RX ORDER — LORAZEPAM 1 MG/1
1 TABLET ORAL EVERY 6 HOURS PRN
COMMUNITY

## 2020-01-31 RX ORDER — CEFUROXIME AXETIL 250 MG/1
500 TABLET ORAL
Status: COMPLETED | OUTPATIENT
Start: 2020-01-31 | End: 2020-01-31

## 2020-01-31 RX ORDER — OMEPRAZOLE 20 MG/1
20 CAPSULE, DELAYED RELEASE ORAL DAILY
Qty: 30 CAPSULE | Refills: 1 | Status: SHIPPED | OUTPATIENT
Start: 2020-01-31 | End: 2020-01-31 | Stop reason: SDUPTHER

## 2020-01-31 RX ORDER — CEFUROXIME AXETIL 500 MG/1
500 TABLET ORAL 2 TIMES DAILY
Qty: 20 TABLET | Refills: 0 | Status: SHIPPED | OUTPATIENT
Start: 2020-01-31 | End: 2020-01-31 | Stop reason: SDUPTHER

## 2020-01-31 RX ORDER — POTASSIUM CHLORIDE 20 MEQ/1
20 TABLET, EXTENDED RELEASE ORAL DAILY
Qty: 7 TABLET | Refills: 0 | Status: SHIPPED | OUTPATIENT
Start: 2020-01-31 | End: 2020-06-24

## 2020-01-31 RX ADMIN — SODIUM CHLORIDE 1000 ML: 0.9 INJECTION, SOLUTION INTRAVENOUS at 12:01

## 2020-01-31 RX ADMIN — PROCHLORPERAZINE EDISYLATE 10 MG: 5 INJECTION INTRAMUSCULAR; INTRAVENOUS at 12:01

## 2020-01-31 RX ADMIN — IOHEXOL 100 ML: 350 INJECTION, SOLUTION INTRAVENOUS at 02:01

## 2020-01-31 RX ADMIN — PROMETHAZINE HYDROCHLORIDE 12.5 MG: 25 INJECTION INTRAMUSCULAR; INTRAVENOUS at 01:01

## 2020-01-31 RX ADMIN — POTASSIUM CHLORIDE 20 MEQ: 20 SOLUTION ORAL at 03:01

## 2020-01-31 RX ADMIN — ALUMINUM HYDROXIDE, MAGNESIUM HYDROXIDE, AND SIMETHICONE: 200; 200; 20 SUSPENSION ORAL at 03:01

## 2020-01-31 RX ADMIN — CEFUROXIME AXETIL 500 MG: 250 TABLET ORAL at 03:01

## 2020-01-31 RX ADMIN — ONDANSETRON 4 MG: 2 INJECTION INTRAMUSCULAR; INTRAVENOUS at 04:01

## 2020-01-31 RX ADMIN — MORPHINE SULFATE 4 MG: 4 INJECTION INTRAVENOUS at 01:01

## 2020-01-31 RX ADMIN — IOHEXOL 30 ML: 350 INJECTION, SOLUTION INTRAVENOUS at 01:01

## 2020-01-31 RX ADMIN — POTASSIUM CHLORIDE 10 MEQ: 7.46 INJECTION, SOLUTION INTRAVENOUS at 03:01

## 2020-01-31 NOTE — ED PROVIDER NOTES
SCRIBE #1 NOTE: I, Narinder Harris, am scribing for, and in the presence of, Krystian Laureano MD. I have scribed the entire note.      History      Chief Complaint   Patient presents with    Abdominal Pain     c/o N/V and abdominal pain for the last 3 weeks and had a CT scheduled today at 2p but couldnt wait       Review of patient's allergies indicates:   Allergen Reactions    Prozac [fluoxetine] Other (See Comments)     Pt states she is intolerant of all antidepressants-makes her forget who or where she is    Adhesive tape-silicones Dermatitis     Skin sluffs off    Powder scent fragrance      Powder from gloves        HPI   HPI    2020, 11:40 AM   History obtained from the patient      History of Present Illness: April VIKTORIA Chan is a 49 y.o. female patient who presents to the Emergency Department for upper abdominal pain, onset 3 weeks PTA. Symptoms are constant and moderate in severity. No mitigating or exacerbating factors reported. Associated sxs include n/v and fever (Tmax 103). Patient denies any chills, SOB, CP, weakness, numbness, dizziness, headache, and all other sxs at this time. No prior Tx reported. No further complaints or concerns at this time.     Arrival mode: Personal vehicle     PCP: Levi Guzman MD       Past Medical History:  Past Medical History:   Diagnosis Date    Anxiety     with severe panic attacks    Chronic fatigue     Depression     Disc degeneration     Disc degeneration     Diverticulosis     Herniated disc     IBS (irritable bowel syndrome)     IgG deficiency     IgG deficiency     MS (multiple sclerosis)     Multiple sclerosis     Osteoarthritis     Other chronic sinusitis     Panic attack        Past Surgical History:  Past Surgical History:   Procedure Laterality Date    BLADDER SURGERY      multiple repairs after tears during ex lap     SECTION, LOW TRANSVERSE      CYSTOTOMY      with repair at time of     FOOT NEUROMA SURGERY       HYSTERECTOMY      [facility] EXPLOR[facility]ORY OF ABDOMEN      Multiple with lysis of adhesions    SHOULDER SURGERY      right         Family History:  Family History   Problem Relation Age of Onset    Colon cancer Mother 30       Social History:  Social History     Tobacco Use    Smoking status: Current Some Day Smoker     Packs/day: 0.50     Years: 27.00     Pack years: 13.50     Types: Cigarettes   Substance and Sexual Activity    Alcohol use: No    Drug use: No    Sexual activity: Not on file       ROS   Review of Systems   Constitutional: Positive for fever (Tmax 103). Negative for chills, diaphoresis and fatigue.   HENT: Negative for sore throat.    Respiratory: Negative for shortness of breath.    Cardiovascular: Negative for chest pain.   Gastrointestinal: Positive for abdominal pain (upper), nausea and vomiting.   Genitourinary: Negative for dysuria.   Musculoskeletal: Negative for back pain.   Skin: Negative for rash and wound.   Neurological: Negative for dizziness, weakness, light-headedness, numbness and headaches.   Hematological: Does not bruise/bleed easily.   All other systems reviewed and are negative.    Physical Exam      Initial Vitals [01/31/20 1123]   BP Pulse Resp Temp SpO2   111/63 100 16 98 °F (36.7 °C) 99 %      MAP       --          Physical Exam  Nursing Notes and Vital Signs Reviewed.  Constitutional: Patient is in no acute distress. Well-developed and well-nourished.  Head: Atraumatic. Normocephalic.  Eyes: PERRL. EOM intact. Conjunctivae are not pale. No scleral icterus.  ENT: Mucous membranes are moist. Oropharynx is clear and symmetric.    Neck: Supple. Full ROM. No lymphadenopathy.  Cardiovascular: Regular rate. Regular rhythm. No murmurs, rubs, or gallops. Distal pulses are 2+ and symmetric.  Pulmonary/Chest: No respiratory distress. Clear to auscultation bilaterally. No wheezing or rales.  Abdominal: Soft and non-distended.  There is epigastric tenderness.  No rebound, guarding, or  rigidity.  Musculoskeletal: Moves all extremities. No obvious deformities. No edema.  Skin: Warm and dry.  Neurological:  Alert, awake, and appropriate.  Normal speech.  No acute focal neurological deficits are appreciated.  Psychiatric: Normal affect. Good eye contact. Appropriate in content.    ED Course    Procedures  ED Vital Signs:  Vitals:    01/31/20 1123 01/31/20 1220 01/31/20 1230   BP: 111/63  111/66   Pulse: 100 80    Resp: 16 20    Temp: 98 °F (36.7 °C)     SpO2: 99% 100%    Weight: 80 kg (176 lb 7.7 oz)         Abnormal Lab Results:  Labs Reviewed   CBC W/ AUTO DIFFERENTIAL - Abnormal; Notable for the following components:       Result Value    RBC 3.97 (*)     Mean Corpuscular Hemoglobin 32.2 (*)     Immature Granulocytes 0.8 (*)     Gran # (ANC) 9.8 (*)     Immature Grans (Abs) 0.09 (*)     Lymph # 0.6 (*)     Gran% 86.0 (*)     Lymph% 5.2 (*)     All other components within normal limits   COMPREHENSIVE METABOLIC PANEL - Abnormal; Notable for the following components:    Potassium 2.8 (*)     AST 9 (*)     ALT 8 (*)     All other components within normal limits   URINALYSIS, REFLEX TO URINE CULTURE - Abnormal; Notable for the following components:    Ketones, UA 1+ (*)     Occult Blood UA 2+ (*)     Nitrite, UA Positive (*)     Leukocytes, UA Trace (*)     All other components within normal limits    Narrative:     Preferred Collection Type->Urine, Clean Catch   LIPASE - Abnormal; Notable for the following components:    Lipase 3 (*)     All other components within normal limits   URINALYSIS MICROSCOPIC - Abnormal; Notable for the following components:    RBC, UA 6 (*)     WBC, UA 18 (*)     Bacteria Many (*)     All other components within normal limits    Narrative:     Preferred Collection Type->Urine, Clean Catch   CULTURE, URINE   HIV 1 / 2 ANTIBODY        All Lab Results:  Results for orders placed or performed during the hospital encounter of 01/31/20   CBC auto differential   Result Value Ref  Range    WBC 11.39 3.90 - 12.70 K/uL    RBC 3.97 (L) 4.00 - 5.40 M/uL    Hemoglobin 12.8 12.0 - 16.0 g/dL    Hematocrit 37.2 37.0 - 48.5 %    Mean Corpuscular Volume 94 82 - 98 fL    Mean Corpuscular Hemoglobin 32.2 (H) 27.0 - 31.0 pg    Mean Corpuscular Hemoglobin Conc 34.4 32.0 - 36.0 g/dL    RDW 12.2 11.5 - 14.5 %    Platelets 325 150 - 350 K/uL    MPV 12.1 9.2 - 12.9 fL    Immature Granulocytes 0.8 (H) 0.0 - 0.5 %    Gran # (ANC) 9.8 (H) 1.8 - 7.7 K/uL    Immature Grans (Abs) 0.09 (H) 0.00 - 0.04 K/uL    Lymph # 0.6 (L) 1.0 - 4.8 K/uL    Mono # 0.8 0.3 - 1.0 K/uL    Eos # 0.0 0.0 - 0.5 K/uL    Baso # 0.03 0.00 - 0.20 K/uL    nRBC 0 0 /100 WBC    Gran% 86.0 (H) 38.0 - 73.0 %    Lymph% 5.2 (L) 18.0 - 48.0 %    Mono% 7.4 4.0 - 15.0 %    Eosinophil% 0.3 0.0 - 8.0 %    Basophil% 0.3 0.0 - 1.9 %    Differential Method Automated    Comprehensive metabolic panel   Result Value Ref Range    Sodium 139 136 - 145 mmol/L    Potassium 2.8 (L) 3.5 - 5.1 mmol/L    Chloride 103 95 - 110 mmol/L    CO2 23 23 - 29 mmol/L    Glucose 107 70 - 110 mg/dL    BUN, Bld 10 6 - 20 mg/dL    Creatinine 0.7 0.5 - 1.4 mg/dL    Calcium 10.2 8.7 - 10.5 mg/dL    Total Protein 7.1 6.0 - 8.4 g/dL    Albumin 4.0 3.5 - 5.2 g/dL    Total Bilirubin 0.7 0.1 - 1.0 mg/dL    Alkaline Phosphatase 97 55 - 135 U/L    AST 9 (L) 10 - 40 U/L    ALT 8 (L) 10 - 44 U/L    Anion Gap 13 8 - 16 mmol/L    eGFR if African American >60 >60 mL/min/1.73 m^2    eGFR if non African American >60 >60 mL/min/1.73 m^2   Urinalysis, Reflex to Urine Culture Urine, Clean Catch   Result Value Ref Range    Specimen UA Urine, Clean Catch     Color, UA Yellow Yellow, Straw, Acacia    Appearance, UA Clear Clear    pH, UA 6.0 5.0 - 8.0    Specific Gravity, UA 1.020 1.005 - 1.030    Protein, UA Negative Negative    Glucose, UA Negative Negative    Ketones, UA 1+ (A) Negative    Bilirubin (UA) Negative Negative    Occult Blood UA 2+ (A) Negative    Nitrite, UA Positive (A) Negative     Urobilinogen, UA Negative <2.0 EU/dL    Leukocytes, UA Trace (A) Negative   Lipase   Result Value Ref Range    Lipase 3 (L) 4 - 60 U/L   Urinalysis Microscopic   Result Value Ref Range    RBC, UA 6 (H) 0 - 4 /hpf    WBC, UA 18 (H) 0 - 5 /hpf    Bacteria Many (A) None-Occ /hpf    Squam Epithel, UA 12 /hpf    Microscopic Comment SEE COMMENT      Imaging Results:  Imaging Results          CT Abdomen Pelvis With Contrast (Final result)  Result time 01/31/20 14:44:29    Final result by Dayron Raza III, MD (01/31/20 14:44:29)                 Impression:      1.  Suspect a few diverticula in the colon without gross evidence of diverticulitis.  In this patient I can not definitely evaluate the wall thickness of the colon due to the lack of contrast.    2.  No kidney stones or obstruction.  No bowel obstruction or free air.  No definite inflammatory mass in the right lower quadrant that would suggest acute appendicitis.    3.  Otherwise as above.  No ascites or unusual fluid collection.  No ofelia abscess.    All CT scans at this facility are performed  using dose modulation techniques as appropriate to performed exam including the following:  automated exposure control; adjustment of mA and/or kV according to the patients size (this includes techniques or standardized protocols for targeted exams where dose is matched to indication/reason for exam: i.e. extremities or head);  iterative reconstruction technique.      Electronically signed by: Dayron Raza MD  Date:    01/31/2020  Time:    14:44             Narrative:    EXAMINATION:  CT ABDOMEN PELVIS WITH CONTRAST    CLINICAL HISTORY:  Abd pain, fever, abscess suspected;    TECHNIQUE:  Axial CT imaging was performed through the abdomen and pelvis with  75cc  of intravenous contrast. Multiplanar reformats were performed and interpreted.    COMPARISON:  None    FINDINGS:  The heart size is normal.  Lung bases show minimal atelectatic change.  There is no free  intraperitoneal air.  No bowel obstruction.  Bony windows show no gross acute abnormality.    Liver is not enlarged.  Surgical clips noted in the gallbladder fossa with minimal prominence of the main intrahepatic biliary ducts.  Borderline splenic size.  There is no adrenal mass or definite enlargement.  Medial limb of the left adrenal is borderline thickened.  No pancreatic mass or enlargement.  There is no ascites or adenopathy.  The kidneys show no solid mass or obstruction.  Moderate volume of stool noted in the colon.    The bladder is unremarkable.  There is no definite abnormality in the right lower quadrant that would suggest acute appendicitis.  Atheromatous change noted along the aorta without aneurysm formation.  Suspect a few diverticula in the colon with no definite radiographic evidence of diverticulitis.  Lack of oral contrast in the colon limits detail.  Can not adequately evaluate the wall thickness.                               The EKG was ordered, reviewed, and independently interpreted by the ED provider.  Interpretation time: 11:46  Rate: 73 BPM  Rhythm: normal sinus rhythm  Interpretation: No acute ST changes. No STEMI.           The Emergency Provider reviewed the vital signs and test results, which are outlined above.    ED Discussion     2:52 PM: Reassessed pt at this time. Discussed with pt all pertinent ED information and results. Discussed pt dx and plan of tx. Gave pt all f/u and return to the ED instructions. All questions and concerns were addressed at this time. Pt expresses understanding of information and instructions, and is comfortable with plan to discharge. Pt is stable for discharge.    I discussed with patient and/or family/caretaker that evaluation in the ED does not suggest any emergent or life threatening medical conditions requiring immediate intervention beyond what was provided in the ED, and I believe patient is safe for discharge.  Regardless, an unremarkable  evaluation in the ED does not preclude the development or presence of a serious of life threatening condition. As such, patient was instructed to return immediately for any worsening or change in current symptoms.         ED Medication(s):  Medications   potassium chloride 10 mEq in 100 mL IVPB (has no administration in time range)   potassium chloride 10% oral solution 20 mEq (has no administration in time range)   GI cocktail (mylanta 30 mL, lidocaine 2 % viscous 10 mL, dicyclomine 10 mL) 50 mL (has no administration in time range)   prochlorperazine injection Soln 10 mg (10 mg Intravenous Given 1/31/20 1226)   sodium chloride 0.9% bolus 1,000 mL (1,000 mLs Intravenous New Bag 1/31/20 1226)   morphine injection 4 mg (4 mg Intravenous Given by Other 1/31/20 1310)   promethazine (PHENERGAN) 12.5 mg in dextrose 5 % 50 mL IVPB (12.5 mg Intravenous New Bag 1/31/20 1310)   iohexol (OMNIPAQUE 350) injection 30 mL (30 mLs Oral Given 1/31/20 1334)   iohexol (OMNIPAQUE 350) injection 100 mL (100 mLs Intravenous Given 1/31/20 1432)   cefUROXime tablet 500 mg (500 mg Oral Given 1/31/20 1503)       Follow-up Information     Levi Guzman MD.    Specialty:  Pediatrics  Contact information:  [address] [facility] [place] [address] [phone]                  New Prescriptions    CEFUROXIME (CEFTIN) 500 MG TABLET    Take 1 tablet (500 mg total) by mouth 2 (two) times daily. for 10 days    OMEPRAZOLE (PRILOSEC) 20 MG CAPSULE    Take 1 capsule (20 mg total) by mouth once daily.    POTASSIUM CHLORIDE SA (K-DUR,KLOR-CON) 20 MEQ TABLET    Take 1 tablet (20 mEq total) by mouth once daily.         Medical Decision Making    Medical Decision Making:   Clinical Tests:   Lab Tests: Ordered and Reviewed  Radiological Study: Ordered and Reviewed  Medical Tests: Ordered and Reviewed           Scribe Attestation:   Scribe #1: I performed the above scribed service and the documentation accurately describes the services I performed. I attest  to the accuracy of the note.    Attending:   Physician Attestation Statement for Scribe #1: I, Krystian Laureano MD, personally performed the services described in this documentation, as scribed by Narinder Harris, in my presence, and it is both accurate and complete.          Clinical Impression       ICD-10-CM ICD-9-CM   1. Generalized abdominal pain R10.84 789.07   2. Acute cystitis with hematuria N30.01 595.0   3. Hypokalemia E87.6 276.8       Disposition:   Disposition: Discharged  Condition: Stable         Krystian Laureano MD  01/31/20 [id number]

## 2020-02-03 LAB — BACTERIA UR CULT: ABNORMAL

## 2020-06-02 ENCOUNTER — OFFICE VISIT (OUTPATIENT)
Dept: PODIATRY | Facility: CLINIC | Age: 50
End: 2020-06-02
Payer: COMMERCIAL

## 2020-06-02 ENCOUNTER — HOSPITAL ENCOUNTER (OUTPATIENT)
Dept: RADIOLOGY | Facility: HOSPITAL | Age: 50
Discharge: HOME | End: 2020-06-02
Attending: PODIATRIST
Payer: COMMERCIAL

## 2020-06-02 VITALS
SYSTOLIC BLOOD PRESSURE: 122 MMHG | WEIGHT: 177.5 LBS | RESPIRATION RATE: 17 BRPM | HEIGHT: 66 IN | DIASTOLIC BLOOD PRESSURE: 74 MMHG | HEART RATE: 89 BPM | BODY MASS INDEX: 28.53 KG/M2

## 2020-06-02 DIAGNOSIS — M77.51 BURSITIS OF INTERMETATARSAL BURSA OF RIGHT FOOT: ICD-10-CM

## 2020-06-02 DIAGNOSIS — G89.29 OTHER CHRONIC PAIN: ICD-10-CM

## 2020-06-02 DIAGNOSIS — M79.671 PAIN IN RIGHT FOOT: ICD-10-CM

## 2020-06-02 DIAGNOSIS — G89.29 OTHER CHRONIC PAIN: Primary | ICD-10-CM

## 2020-06-02 DIAGNOSIS — G57.81 NEUROMA OF THIRD INTERSPACE OF RIGHT FOOT: Primary | ICD-10-CM

## 2020-06-02 PROCEDURE — 99999 PR PBB SHADOW E&M-EST. PATIENT-LVL III: ICD-10-PCS | Mod: PBBFAC,,, | Performed by: PODIATRIST

## 2020-06-02 PROCEDURE — 73630 X-RAY EXAM OF FOOT: CPT | Mod: 26,RT,, | Performed by: RADIOLOGY

## 2020-06-02 PROCEDURE — 99999 PR PBB SHADOW E&M-EST. PATIENT-LVL III: CPT | Mod: PBBFAC,,, | Performed by: PODIATRIST

## 2020-06-02 PROCEDURE — 73630 X-RAY EXAM OF FOOT: CPT | Mod: TC,RT

## 2020-06-02 PROCEDURE — 64450 NJX AA&/STRD OTHER PN/BRANCH: CPT | Mod: RT,S$GLB,, | Performed by: PODIATRIST

## 2020-06-02 PROCEDURE — 99203 PR OFFICE/OUTPT VISIT, NEW, LEVL III, 30-44 MIN: ICD-10-PCS | Mod: 25,S$GLB,, | Performed by: PODIATRIST

## 2020-06-02 PROCEDURE — 73630 XR FOOT COMPLETE 3 VIEW RIGHT: ICD-10-PCS | Mod: 26,RT,, | Performed by: RADIOLOGY

## 2020-06-02 PROCEDURE — 64450 PR NERVE BLOCK INJ, ANES/STEROID, OTHER PERIPHERAL: ICD-10-PCS | Mod: RT,S$GLB,, | Performed by: PODIATRIST

## 2020-06-02 PROCEDURE — 99203 OFFICE O/P NEW LOW 30 MIN: CPT | Mod: 25,S$GLB,, | Performed by: PODIATRIST

## 2020-06-02 RX ORDER — METHYLPREDNISOLONE ACETATE 40 MG/ML
40 INJECTION, SUSPENSION INTRA-ARTICULAR; INTRALESIONAL; INTRAMUSCULAR; SOFT TISSUE
Status: COMPLETED | OUTPATIENT
Start: 2020-06-02 | End: 2020-06-02

## 2020-06-02 RX ORDER — DEXAMETHASONE SODIUM PHOSPHATE 4 MG/ML
4 INJECTION, SOLUTION INTRA-ARTICULAR; INTRALESIONAL; INTRAMUSCULAR; INTRAVENOUS; SOFT TISSUE ONCE
Status: COMPLETED | OUTPATIENT
Start: 2020-06-02 | End: 2020-06-02

## 2020-06-02 RX ADMIN — METHYLPREDNISOLONE ACETATE 40 MG: 40 INJECTION, SUSPENSION INTRA-ARTICULAR; INTRALESIONAL; INTRAMUSCULAR; SOFT TISSUE at 04:06

## 2020-06-02 RX ADMIN — DEXAMETHASONE SODIUM PHOSPHATE 4 MG: 4 INJECTION, SOLUTION INTRA-ARTICULAR; INTRALESIONAL; INTRAMUSCULAR; INTRAVENOUS; SOFT TISSUE at 04:06

## 2020-06-02 NOTE — PROGRESS NOTES
Subjective:       Patient ID: Mary Jo Chan is a 49 y.o. female.    Chief Complaint: Foot Pain (right foot pain,numbness,reports pain 7/10,non-diabetic, PCP Dr. Slaughter )    HPI: Mary Jo Chan presents to the clinic today, concerning discomfort at the right foot at the 3rd and 4th webspace this.  Patient states severe pains been recalcitrant to oral Tylenol or NSAID therapy. Rates the pains at approx. 8/10, at least. States pains are sharp in nature, most of the time, especially with weightbearing. When not bearing weight, pains are described as achy.  Pains been present now for the past several several days.  States prior radiographic evaluation. Levi Guzman MD is patient's primary care provider.  Patient does state a history of prior neuroma resection, right 3rd webspace.  Patient presents ambulatory with sneakers.    Review of patient's allergies indicates:   Allergen Reactions    Prozac [fluoxetine] Other (See Comments)     Pt states she is intolerant of all antidepressants-makes her forget who or where she is    Adhesive tape-silicones Dermatitis     Skin sluffs off    Powder scent fragrance      Powder from gloves       Past Medical History:   Diagnosis Date    Anxiety     with severe panic attacks    Chronic fatigue     Depression     Disc degeneration     Disc degeneration     Diverticulosis     Herniated disc     IBS (irritable bowel syndrome)     IgG deficiency     IgG deficiency     MS (multiple sclerosis)     Multiple sclerosis     Osteoarthritis     Other chronic sinusitis     Panic attack        Family History   Problem Relation Age of Onset    Colon cancer Mother 30       Social History     Socioeconomic History    Marital status:      Spouse name: Not on file    Number of children: Not on file    Years of education: Not on file    Highest education level: Not on file   Occupational History    Not on file   Social Needs    Financial resource strain: Not on  file    Food insecurity:     Worry: Not on file     Inability: Not on file    Transportation needs:     Medical: Not on file     Non-medical: Not on file   Tobacco Use    Smoking status: Current Some Day Smoker     Packs/day: 0.50     Years: 27.00     Pack years: 13.50     Types: Cigarettes   Substance and Sexual Activity    Alcohol use: No    Drug use: No    Sexual activity: Not on file   Lifestyle    Physical activity:     Days per week: Not on file     Minutes per session: Not on file    Stress: Not on file   Relationships    Social connections:     Talks on phone: Not on file     Gets together: Not on file     Attends Faith service: Not on file     Active member of club or organization: Not on file     Attends meetings of clubs or organizations: Not on file     Relationship status: Not on file   Other Topics Concern    Not on file   Social History Narrative           Past Surgical History:   Procedure Laterality Date    BLADDER SURGERY      multiple repairs after tears during ex lap     SECTION, LOW TRANSVERSE      CYSTOTOMY      with repair at time of     FOOT NEUROMA SURGERY      HYSTERECTOMY      [place] EXPLORATORY OF ABDOMEN      Multiple with lysis of adhesions    SHOULDER SURGERY      right       Review of Systems   Constitutional: Negative for chills, fatigue and fever.   HENT: Negative for hearing loss.    Eyes: Negative for photophobia and visual disturbance.   Respiratory: Negative for cough, chest tightness, shortness of breath and wheezing.    Cardiovascular: Negative for chest pain and palpitations.   Gastrointestinal: Negative for constipation, diarrhea, nausea and vomiting.   Endocrine: Negative for cold intolerance and heat intolerance.   Genitourinary: Negative for flank pain.   Musculoskeletal: Positive for arthralgias and gait problem. Negative for neck pain and neck stiffness.   Skin: Negative for wound.   Neurological: Negative for light-headedness  "and headaches.        Neuritis, right foot   Psychiatric/Behavioral: Negative for sleep disturbance.          Objective:   /74   Pulse 89   Resp 17   Ht 5' 6" (1.676 m)   Wt 80.5 kg (177 lb 7.5 oz)   BMI 28.64 kg/m²     X-Ray Foot Complete Right  Narrative: EXAMINATION:  XR FOOT COMPLETE 3 VIEW RIGHT    CLINICAL HISTORY:  . Other chronic pain    TECHNIQUE:  AP, lateral, and oblique views of the right foot were performed.    COMPARISON:  None    FINDINGS:  Osteopenia.  No fracture or dislocation or erosive changes.  Soft tissues appear normal.  Impression: As above    Electronically signed by: Evan Pinto MD  Date:    06/02/2020  Time:    15:51       Physical Exam    LOWER EXTREMITY PHYSICAL EXAMINATION    DERMATOLOGY: Skin is supple, dry and intact. No ecchymosis is noted. No hypertrophic skin formation. No erythema or cellulitis is noted.    VASCULAR: On the right foot, the dorsalis pedis pulse is 2/4 and the posterior tibial pulse is 2/4. Capillary refill time is less than 3 seconds. Hair growth is present on the dorsum of the foot and at the digits. No rubor is present. Proximal to distal temperature is warm to warm.    ORTHOPEDIC: Manual Muscle Testing is 5/5 in all planes on the right, without pains, with and without resistance. No pains to palpation of the medial or lateral ankle ligaments. No discomfort to palpation of the posterior tibial tendon, peroneal tendon, Achilles tendon or the anterior ankle tendons.  Discomfort to palpation of the right 3rd and 2nd webspaces.  Discomfort to the plantar aspects of the right 2nd through 4th metatarsal heads.  Discomfort to palpation of the dorsal surface of the 3rd and 4th metatarsals at the metaphysis.  Edema is noted atop the diaphysis.  Gait pattern is antalgic.    NEUROLOGY: Proprioception is intact. Sensation to light touch is intact. Vibratory sensation is WNL. Upon palpation of the interspaces, particularly the right 3rd, there are neurological " sensations stated that radiate proximal and distal. Upon compression of the metatarsal heads from medial to lateral, neurological sensations and symptoms are stated..    Assessment:     1. 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 Bursitis of intermetatarsal bursa of right foot    3. 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  XRAYS are reviewed in detail with the patient. All questions and concerns regarding findings and its/their implications are outlined and discussed.    Following sterile preparation with alcohol swab and/or betadine paint, injection was given into and around the area of the right 3rd webspace, using 25-gauge needle. Injection consisted of approximately 0.5cc of Dexamethasone Sodium Phosphate, 0.5cc of Depo-Medrol (40mg/dL) and 0.5cc of 1% Lidocaine w/ or w/o Epinephrine or 0.25% or 0.50% Marcaine plain. Bandage application thereafter. Patient tolerated procedure well, and without complications or complaints. Patient educated that the area of pathology, might actually be slightly more painful, due to the injection, over the course of the next one to two days.        Did discuss proper and supportive shoe gear in detail and at length with the patient.  These are shoes with firm and robust arch support; medial counter.  Shoes which only bend at the metatarsophalangeal joint and which are rigid in the midfoot and hindfoot. Patient urged to purchase running type or cross training type shoes gear which are designed for pronation control.    If no symptomatic improvement in approximately 2 weeks, we will obtain MRI evaluation.               Future Appointments   Date  Time Provider Department [place]   6/16/2020  9:45 AM Richie Dunn DPM [facility]

## 2020-06-09 ENCOUNTER — OFFICE VISIT (OUTPATIENT)
Dept: PODIATRY | Facility: CLINIC | Age: 50
End: 2020-06-09
Payer: COMMERCIAL

## 2020-06-09 ENCOUNTER — TELEPHONE (OUTPATIENT)
Dept: PODIATRY | Facility: CLINIC | Age: 50
End: 2020-06-09

## 2020-06-09 VITALS — HEIGHT: 66 IN | BODY MASS INDEX: 28.53 KG/M2 | WEIGHT: 177.5 LBS | RESPIRATION RATE: 17 BRPM

## 2020-06-09 DIAGNOSIS — G57.81 NEUROMA OF THIRD INTERSPACE OF RIGHT FOOT: Primary | ICD-10-CM

## 2020-06-09 DIAGNOSIS — M77.51 BURSITIS OF INTERMETATARSAL BURSA OF RIGHT FOOT: ICD-10-CM

## 2020-06-09 DIAGNOSIS — M79.671 PAIN IN RIGHT FOOT: ICD-10-CM

## 2020-06-09 PROCEDURE — 99214 OFFICE O/P EST MOD 30 MIN: CPT | Mod: S$GLB,,, | Performed by: PODIATRIST

## 2020-06-09 PROCEDURE — 99214 PR OFFICE/OUTPT VISIT, EST, LEVL IV, 30-39 MIN: ICD-10-PCS | Mod: S$GLB,,, | Performed by: PODIATRIST

## 2020-06-09 PROCEDURE — 99999 PR PBB SHADOW E&M-EST. PATIENT-LVL III: CPT | Mod: PBBFAC,,, | Performed by: PODIATRIST

## 2020-06-09 PROCEDURE — 99999 PR PBB SHADOW E&M-EST. PATIENT-LVL III: ICD-10-PCS | Mod: PBBFAC,,, | Performed by: PODIATRIST

## 2020-06-09 NOTE — H&P (VIEW-ONLY)
Subjective:       Patient ID: Mary Jo Chan is a 49 y.o. female.    Chief Complaint: Foot Pain (right foot pain, wears walking boot, nondaibetic, reports pain 7/10, PCP Dr. Guzman)    HPI: Mary Jo Chan presents to the clinic today, for follow up concerning discomfort at the right foot at the 3rd. Patient is ambulatory with a CAM Walker. At her last appointment patient did receive a cortisone injection to the associated webspace.  She states no effect.  At this time, her pains are consistent a 7/10.  She does have a history of prior right 3rd webspace neuroma resection.  No outpatient physical therapy juncture for this pathology.  No ultrasound modality for imaging.  States prior radiographic evaluation.  Patient states her pains are recalcitrant to Tylenol and/or NSAIDs.    Review of patient's allergies indicates:   Allergen Reactions    Prozac [fluoxetine] Other (See Comments)     Pt states she is intolerant of all antidepressants-makes her forget who or where she is    Adhesive tape-silicones Dermatitis     Skin sluffs off    Powder scent fragrance      Powder from gloves       Past Medical History:   Diagnosis Date    Anxiety     with severe panic attacks    Chronic fatigue     Depression     Disc degeneration     Disc degeneration     Diverticulosis     Herniated disc     IBS (irritable bowel syndrome)     IgG deficiency     IgG deficiency     MS (multiple sclerosis)     Multiple sclerosis     Osteoarthritis     Other chronic sinusitis     Panic attack        Family History   Problem Relation Age of Onset    Colon cancer Mother 30       Social History     Socioeconomic History    Marital status:      Spouse name: Not on file    Number of children: Not on file    Years of education: Not on file    Highest education level: Not on file   Occupational History    Not on file   Social Needs    Financial resource strain: Not on file    Food insecurity:     Worry: Not on file      Inability: Not on file    Transportation needs:     Medical: Not on file     Non-medical: Not on file   Tobacco Use    Smoking status: Current Some Day Smoker     Packs/day: 0.50     Years: 27.00     Pack years: 13.50     Types: Cigarettes   Substance and Sexual Activity    Alcohol use: No    Drug use: No    Sexual activity: Not on file   Lifestyle    Physical activity:     Days per week: Not on file     Minutes per session: Not on file    Stress: Not on file   Relationships    Social connections:     Talks on phone: Not on file     Gets together: Not on file     Attends Judaism service: Not on file     Active member of club or organization: Not on file     Attends meetings of clubs or organizations: Not on file     Relationship status: Not on file   Other Topics Concern    Not on file   Social History Narrative           Past Surgical History:   Procedure Laterality Date    BLADDER SURGERY      multiple repairs after tears during ex lap     SECTION, LOW TRANSVERSE      CYSTOTOMY      with repair at time of     FOOT NEUROMA SURGERY      HYSTERECTOMY      [place] EXPLORATORY OF ABDOMEN      Multiple with lysis of adhesions    SHOULDER SURGERY      right       Review of Systems   Constitutional: Negative for chills, fatigue and fever.   HENT: Negative for hearing loss.    Eyes: Negative for photophobia and visual disturbance.   Respiratory: Negative for cough, chest tightness, shortness of breath and wheezing.    Cardiovascular: Negative for chest pain and palpitations.   Gastrointestinal: Negative for constipation, diarrhea, nausea and vomiting.   Endocrine: Negative for cold intolerance and heat intolerance.   Genitourinary: Negative for flank pain.   Musculoskeletal: Positive for arthralgias, back pain and gait problem. Negative for neck pain and neck stiffness.   Skin: Negative for wound.   Neurological: Negative for light-headedness and headaches.        Neuritis, right foot  "  Psychiatric/Behavioral: Negative for sleep disturbance.          Objective:   Resp 17   Ht 5' 6" (1.676 m)   Wt 80.5 kg (177 lb 7.5 oz)   BMI 28.64 kg/m²          LOWER EXTREMITY PHYSICAL EXAMINATION  NEUROLOGY: Proprioception is intact. Sensation to light touch is intact. Vibratory sensation is WNL. Upon palpation of the interspaces, particularly the right 3rd, there are neurological sensations stated that radiate proximal and distal. Upon compression of the metatarsal heads from medial to lateral, neurological sensations and symptoms are stated..    VASCULAR: On the right foot, the dorsalis pedis pulse is 2/4 and the posterior tibial pulse is 2/4. Capillary refill time is less than 3 seconds. Hair growth is present on the dorsum of the foot and at the digits. No rubor is present. Proximal to distal temperature is warm to warm.    DERMATOLOGY: Skin is supple, dry and intact. No ecchymosis is noted. No erythema or cellulitis is noted.    ORTHOPEDIC: Manual Muscle Testing is 5/5 in all planes on the right, without pains, with and without resistance. No pains to palpation of the medial or lateral ankle ligaments. No discomfort to palpation of the posterior tibial tendon, peroneal tendon, Achilles tendon or the anterior ankle tendons.  Discomfort to palpation of the right 3rd and 2nd webspaces.  Discomfort to the plantar aspects of the right 2nd through 4th metatarsal heads.  Discomfort to palpation of the dorsal surface of the 3rd and 4th metatarsals at the metaphysis.  Edema is noted atop the diaphysis.  Gait pattern is antalgic.        Assessment:     1. 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 Bursitis of intermetatarsal bursa of right foot    3. 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 No injection therapy this afternoon. XRAYS are reviewed in detail with the patient. All questions and concerns regarding findings and its/their implications are outlined and discussed. Please obtain MRI evaluation to R/O stump neuroma vs bursitis.            No future appointments.

## 2020-06-09 NOTE — PROGRESS NOTES
Subjective:       Patient ID: Mary Jo Chan is a 49 y.o. female.    Chief Complaint: Foot Pain (right foot pain, wears walking boot, nondaibetic, reports pain 7/10, PCP Dr. Guzman)    HPI: Mary Jo Chan presents to the clinic today, for follow up concerning discomfort at the right foot at the 3rd. Patient is ambulatory with a CAM Walker. At her last appointment patient did receive a cortisone injection to the associated webspace.  She states no effect.  At this time, her pains are consistent a 7/10.  She does have a history of prior right 3rd webspace neuroma resection.  No outpatient physical therapy juncture for this pathology.  No ultrasound modality for imaging.  States prior radiographic evaluation.  Patient states her pains are recalcitrant to Tylenol and/or NSAIDs.    Review of patient's allergies indicates:   Allergen Reactions    Prozac [fluoxetine] Other (See Comments)     Pt states she is intolerant of all antidepressants-makes her forget who or where she is    Adhesive tape-silicones Dermatitis     Skin sluffs off    Powder scent fragrance      Powder from gloves       Past Medical History:   Diagnosis Date    Anxiety     with severe panic attacks    Chronic fatigue     Depression     Disc degeneration     Disc degeneration     Diverticulosis     Herniated disc     IBS (irritable bowel syndrome)     IgG deficiency     IgG deficiency     MS (multiple sclerosis)     Multiple sclerosis     Osteoarthritis     Other chronic sinusitis     Panic attack        Family History   Problem Relation Age of Onset    Colon cancer Mother 30       Social History     Socioeconomic History    Marital status:      Spouse name: Not on file    Number of children: Not on file    Years of education: Not on file    Highest education level: Not on file   Occupational History    Not on file   Social Needs    Financial resource strain: Not on file    Food insecurity:     Worry: Not on file      Inability: Not on file    Transportation needs:     Medical: Not on file     Non-medical: Not on file   Tobacco Use    Smoking status: Current Some Day Smoker     Packs/day: 0.50     Years: 27.00     Pack years: 13.50     Types: Cigarettes   Substance and Sexual Activity    Alcohol use: No    Drug use: No    Sexual activity: Not on file   Lifestyle    Physical activity:     Days per week: Not on file     Minutes per session: Not on file    Stress: Not on file   Relationships    Social connections:     Talks on phone: Not on file     Gets together: Not on file     Attends Nondenominational service: Not on file     Active member of club or organization: Not on file     Attends meetings of clubs or organizations: Not on file     Relationship status: Not on file   Other Topics Concern    Not on file   Social History Narrative           Past Surgical History:   Procedure Laterality Date    BLADDER SURGERY      multiple repairs after tears during ex lap     SECTION, LOW TRANSVERSE      CYSTOTOMY      with repair at time of     FOOT NEUROMA SURGERY      HYSTERECTOMY      [place] EXPLORATORY OF ABDOMEN      Multiple with lysis of adhesions    SHOULDER SURGERY      right       Review of Systems   Constitutional: Negative for chills, fatigue and fever.   HENT: Negative for hearing loss.    Eyes: Negative for photophobia and visual disturbance.   Respiratory: Negative for cough, chest tightness, shortness of breath and wheezing.    Cardiovascular: Negative for chest pain and palpitations.   Gastrointestinal: Negative for constipation, diarrhea, nausea and vomiting.   Endocrine: Negative for cold intolerance and heat intolerance.   Genitourinary: Negative for flank pain.   Musculoskeletal: Positive for arthralgias, back pain and gait problem. Negative for neck pain and neck stiffness.   Skin: Negative for wound.   Neurological: Negative for light-headedness and headaches.        Neuritis, right foot  "  Psychiatric/Behavioral: Negative for sleep disturbance.          Objective:   Resp 17   Ht 5' 6" (1.676 m)   Wt 80.5 kg (177 lb 7.5 oz)   BMI 28.64 kg/m²          LOWER EXTREMITY PHYSICAL EXAMINATION  NEUROLOGY: Proprioception is intact. Sensation to light touch is intact. Vibratory sensation is WNL. Upon palpation of the interspaces, particularly the right 3rd, there are neurological sensations stated that radiate proximal and distal. Upon compression of the metatarsal heads from medial to lateral, neurological sensations and symptoms are stated..    VASCULAR: On the right foot, the dorsalis pedis pulse is 2/4 and the posterior tibial pulse is 2/4. Capillary refill time is less than 3 seconds. Hair growth is present on the dorsum of the foot and at the digits. No rubor is present. Proximal to distal temperature is warm to warm.    DERMATOLOGY: Skin is supple, dry and intact. No ecchymosis is noted. No erythema or cellulitis is noted.    ORTHOPEDIC: Manual Muscle Testing is 5/5 in all planes on the right, without pains, with and without resistance. No pains to palpation of the medial or lateral ankle ligaments. No discomfort to palpation of the posterior tibial tendon, peroneal tendon, Achilles tendon or the anterior ankle tendons.  Discomfort to palpation of the right 3rd and 2nd webspaces.  Discomfort to the plantar aspects of the right 2nd through 4th metatarsal heads.  Discomfort to palpation of the dorsal surface of the 3rd and 4th metatarsals at the metaphysis.  Edema is noted atop the diaphysis.  Gait pattern is antalgic.        Assessment:     1. 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 Bursitis of intermetatarsal bursa of right foot    3. 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 No injection therapy this afternoon. XRAYS are reviewed in detail with the patient. All questions and concerns regarding findings and its/their implications are outlined and discussed. Please obtain MRI evaluation to R/O stump neuroma vs bursitis.            No future appointments.

## 2020-06-09 NOTE — TELEPHONE ENCOUNTER
Spoke with pt she wanted to know if she can go to an outside provider to get her MRI done. I informed the pt that Dr is in surgery and I will give him the message also to call the place she is trying to go to and see how sooner they can get her a appt.      Ce Delgado MA  Podiatry Surgical Department  [facility]                ----- Message from Pauly Lamas sent at 6/9/2020 12:59 PM CDT -----  Contact: PT  PT called in regards to her MRI - asking to have it done sooner if possible, even if its out side of [facility].     Callback: [phone]

## 2020-06-12 ENCOUNTER — TELEPHONE (OUTPATIENT)
Dept: PODIATRY | Facility: CLINIC | Age: 50
End: 2020-06-12

## 2020-06-12 DIAGNOSIS — M77.51 BURSITIS OF INTERMETATARSAL BURSA OF RIGHT FOOT: Primary | ICD-10-CM

## 2020-06-12 DIAGNOSIS — G57.81 NEUROMA OF THIRD INTERSPACE OF RIGHT FOOT: ICD-10-CM

## 2020-06-12 NOTE — TELEPHONE ENCOUNTER
Spoke with she wants to put her MRI test back a [facility]. I informed the pt that the Dr is in surgery and I will give him the information when  he comes to the office I will arnaud the pt back with the information she is requesting.    Ce Delgado MA  Podiatry Surgical Department  [facility]          ----- Message from Joy Lamas sent at 6/12/2020  2:48 PM CDT -----  Contact: self  Pt requesting a call back regarding MRI. Please call pt back at [phone]

## 2020-06-12 NOTE — TELEPHONE ENCOUNTER
Spoke with pt I informed her that the office sent the paper over on June 9, 2020 and I looked through her chart and its said that's its pending I told her to call her insurance company and to see why its still pending.      Ce Delgado MA  Podiatry Surgical Department  [facility]            ----- Message from Lachelle Lam sent at 6/12/2020  1:48 PM CDT -----  Contact: [phone]/self  Patient is requesting a call back. [facility] has not received the faxed orders. Please call he to confirm they have been sent. Thanks

## 2020-06-12 NOTE — TELEPHONE ENCOUNTER
Spoke with pt she wanted to know if the office fax over the MRI papers to [facility]. I informed the pt that we faxed it over.      Ce Delgado MA  Podiatry Surgical Department  [facility]                 ----- Message from Susanne Chadwick sent at 6/12/2020  1:37 PM CDT -----  Contact: self [phone]  Pt is calling to consult with nurse about her appointment for MRI was cancel. Pt states she was going to go to a different location but order was never sent. Please call pt back at [phone]. Thanks tp

## 2020-06-23 ENCOUNTER — HOSPITAL ENCOUNTER (OUTPATIENT)
Dept: RADIOLOGY | Facility: HOSPITAL | Age: 50
Discharge: HOME | End: 2020-06-23
Attending: PODIATRIST
Payer: COMMERCIAL

## 2020-06-23 DIAGNOSIS — M84.374S METATARSAL STRESS FRACTURE, RIGHT, SEQUELA: Primary | ICD-10-CM

## 2020-06-23 DIAGNOSIS — E55.9 VITAMIN D INSUFFICIENCY: ICD-10-CM

## 2020-06-23 DIAGNOSIS — Z01.818 PREOP TESTING: ICD-10-CM

## 2020-06-23 DIAGNOSIS — G57.81 NEUROMA OF THIRD INTERSPACE OF RIGHT FOOT: ICD-10-CM

## 2020-06-23 DIAGNOSIS — M77.51 BURSITIS OF INTERMETATARSAL BURSA OF RIGHT FOOT: ICD-10-CM

## 2020-06-23 PROCEDURE — 73720 MRI LWR EXTREMITY W/O&W/DYE: CPT | Mod: TC,RT

## 2020-06-23 PROCEDURE — 25500020 PHARM REV CODE 255: Performed by: PODIATRIST

## 2020-06-23 PROCEDURE — A9585 GADOBUTROL INJECTION: HCPCS | Performed by: PODIATRIST

## 2020-06-23 RX ORDER — GADOBUTROL 604.72 MG/ML
10 INJECTION INTRAVENOUS
Status: COMPLETED | OUTPATIENT
Start: 2020-06-23 | End: 2020-06-23

## 2020-06-23 RX ADMIN — GADOBUTROL 8 ML: 604.72 INJECTION INTRAVENOUS at 09:06

## 2020-06-24 ENCOUNTER — HOSPITAL ENCOUNTER (OUTPATIENT)
Dept: RADIOLOGY | Facility: HOSPITAL | Age: 50
Discharge: HOME | End: 2020-06-24
Attending: PODIATRIST
Payer: COMMERCIAL

## 2020-06-24 DIAGNOSIS — Z01.818 PREOP TESTING: ICD-10-CM

## 2020-06-24 PROCEDURE — 71046 X-RAY EXAM CHEST 2 VIEWS: CPT | Mod: 26,,, | Performed by: RADIOLOGY

## 2020-06-24 PROCEDURE — 71046 X-RAY EXAM CHEST 2 VIEWS: CPT | Mod: TC

## 2020-06-24 PROCEDURE — 71046 XR CHEST PA AND LATERAL: ICD-10-PCS | Mod: 26,,, | Performed by: RADIOLOGY

## 2020-06-24 RX ORDER — ATORVASTATIN CALCIUM 20 MG/1
10 TABLET, FILM COATED ORAL DAILY
COMMUNITY

## 2020-06-24 RX ORDER — ACETAMINOPHEN 500 MG
500 TABLET ORAL EVERY 6 HOURS PRN
COMMUNITY

## 2020-06-24 NOTE — PRE ADMISSION SCREENING
Pre op instructions reviewed with patient per phone:    To confirm, Your surgeon has instructed you:  Surgery is scheduled 06/29/20at 1215.      Please report to [facility] floor main lobby by 1045.  Pre admit office to call afternoon prior to surgery with final arrival time.  If surgery is on Monday, Pre admit office to call Friday afternoon with with final arrival time.    Covid 19 testing is scheduled for 06/26/20  at  1530  @ [facility]  Please self quarantine after Covid testing, prior to surgery    INSTRUCTIONS IMPORTANT!!!  ¨ No smoking after 12 midnight, the night before surgery.  ¨ No solid food after 12 midnight, but you may have clear liquids up until 3 hours prior to surgery.  This includes: grape, cranberry, and apple juice (not orange, and no coffee.)   ¨ OK to brush teeth, but no gum, candy or mints!    ¨ Take only these medicines with a small swallow of water-morning of surgery.  Baclofen, Tecfidera, Ativan, Atorvastatin        ____   Due to COVID 19 concerns, 1 visitor will be allowed in the pre operative area, and must adhere to social distancing guidelines.  One visitor/family member is currently allowed to visit in-patient rooms from 10:00 a.m to 6:00 p.m  ____   Family/caregivers will be updated re pt status via text/cell phone      ____  Do not wear makeup, including mascara.  ____  No powder, lotions or creams to surgical area.  ____  Please remove all jewelry, including piercings and leave at home.  ____  No money or valuables needed. Please leave at home.  ____  Please bring identification and insurance information to hospital.  ____  If going home the same day, arrange for a ride home. You will not be able to   drive if Anesthesia was used.  ____  Children, under 12 years old, must remain in the waiting room with an adult.  They are not allowed in patient areas.  ____  Wear loose fitting clothing. Allow for dressings, bandages.  ____  Stop Aspirin, Ibuprofen, Motrin  and Aleve at least 5-7 days before surgery, unless otherwise instructed by your doctor, or the nurse.   You MAY use Tylenol/acetaminophen until day of surgery.  ____  If you take diabetic medication, do not take am of surgery unless instructed by   Doctor.  ____ Stop taking any Fish Oil supplement or any Vitamins that contain Vitamin E at least 5 days prior to surgery.          Bathing Instructions-- The night before surgery and the morning prior to coming to the hospital:   -Do not shave the surgical area.   -Shower and wash your hair and body as usual with your regular soap and shampoo.   -Rinse your hair and body completely.   -Use one packet of hibiclens to wash the surgical site (using your hand) gently for 5 minutes.  Do not scrub you skin too hard.   -Do not use hibiclens on your head, face, or genitals.   -Do not wash with regular soap after you use the hibiclens.   -Rinse your body thoroughly.   -Dry with clean, soft towel.  Do not use lotion, cream, deodorant, or powders on   the surgical site.    Use antibacterial soap in place of hibiclens if your surgery is on the head, face or genitals.         Surgical Site Infection    Prevention of surgical site infections:     -Keep incisions clean and dry.   -Do not soak/submerge incisions in water until completely healed.   -Do not apply lotions, powders, creams, or deodorants to site.   -Always make sure hands are cleaned with antibacterial soap/ alcohol-based   prior to touching the surgical site.  (This includes doctors, nurses, staff, and yourself.)    Signs and symptoms:   -Redness and pain around the area where you had surgery   -Drainage of cloudy fluid from your surgical wound   -Fever over 100.4  I have read or had read and explained to me, and understand the above information.

## 2020-06-25 ENCOUNTER — TELEPHONE (OUTPATIENT)
Dept: PODIATRY | Facility: CLINIC | Age: 50
End: 2020-06-25

## 2020-06-25 ENCOUNTER — OFFICE VISIT (OUTPATIENT)
Dept: PODIATRY | Facility: CLINIC | Age: 50
End: 2020-06-25
Payer: COMMERCIAL

## 2020-06-25 VITALS
SYSTOLIC BLOOD PRESSURE: 109 MMHG | BODY MASS INDEX: 29.41 KG/M2 | HEIGHT: 66 IN | DIASTOLIC BLOOD PRESSURE: 72 MMHG | HEART RATE: 87 BPM | WEIGHT: 183 LBS

## 2020-06-25 DIAGNOSIS — M84.374S STRESS FRACTURE OF METATARSAL BONE OF RIGHT FOOT, SEQUELA: Primary | ICD-10-CM

## 2020-06-25 DIAGNOSIS — E55.9 VITAMIN D INSUFFICIENCY: ICD-10-CM

## 2020-06-25 DIAGNOSIS — Z72.0 TOBACCO ABUSE: ICD-10-CM

## 2020-06-25 DIAGNOSIS — M79.671 PAIN IN RIGHT FOOT: ICD-10-CM

## 2020-06-25 DIAGNOSIS — G35 MULTIPLE SCLEROSIS: ICD-10-CM

## 2020-06-25 PROCEDURE — 99214 PR OFFICE/OUTPT VISIT, EST, LEVL IV, 30-39 MIN: ICD-10-PCS | Mod: S$GLB,,, | Performed by: PODIATRIST

## 2020-06-25 PROCEDURE — 99999 PR PBB SHADOW E&M-EST. PATIENT-LVL III: CPT | Mod: PBBFAC,,, | Performed by: PODIATRIST

## 2020-06-25 PROCEDURE — 99214 OFFICE O/P EST MOD 30 MIN: CPT | Mod: S$GLB,,, | Performed by: PODIATRIST

## 2020-06-25 PROCEDURE — 99999 PR PBB SHADOW E&M-EST. PATIENT-LVL III: ICD-10-PCS | Mod: PBBFAC,,, | Performed by: PODIATRIST

## 2020-06-25 RX ORDER — DOXYCYCLINE 100 MG/1
100 CAPSULE ORAL EVERY 12 HOURS
Qty: 14 CAPSULE | Refills: 0 | Status: SHIPPED | OUTPATIENT
Start: 2020-06-25 | End: 2020-07-02

## 2020-06-25 RX ORDER — ERGOCALCIFEROL 1.25 MG/1
50000 CAPSULE ORAL
Qty: 10 CAPSULE | Refills: 2 | Status: SHIPPED | OUTPATIENT
Start: 2020-06-25 | End: 2020-10-22

## 2020-06-25 RX ORDER — OXYCODONE AND ACETAMINOPHEN 10; 325 MG/1; MG/1
1 TABLET ORAL EVERY 6 HOURS PRN
Qty: 28 TABLET | Refills: 0 | Status: SHIPPED | OUTPATIENT
Start: 2020-06-25 | End: 2020-07-02

## 2020-06-25 NOTE — H&P (VIEW-ONLY)
Subjective:       Patient ID: Mary Jo Chan is a 49 y.o. female.    Chief Complaint: Follow-up (surgery consent rates pain 5/10 walking boot non diabetic pt pcp Dr. Guzman.)    HPI: Mary Jo Chan presents to the clinic today, for follow up concerning discomfort at the right foot at the 3rd.  Patient did have recent MRI which did show stress fracture of the distal 3rd metatarsal metaphysis.  Patient presents afternoon to discuss possible surgical intervention.  States her pains are consistent 5/10.  She does have a history of multiple sclerosis as well as tobacco abuse.  Patient presents ambulatory with a walking boot.    Review of patient's allergies indicates:   Allergen Reactions    Prozac [fluoxetine] Other (See Comments)     Pt states she is intolerant of all antidepressants-makes her forget who or where she is    Adhesive tape-silicones Dermatitis     Skin sluffs off, burns skin    Powder scent fragrance Rash     Powder from gloves       Past Medical History:   Diagnosis Date    Anxiety     with severe panic attacks    Chronic fatigue     Depression     Disc degeneration     Disc degeneration     Diverticulosis     Herniated disc     IBS (irritable bowel syndrome)     IgG deficiency     Multiple sclerosis     Osteoarthritis     Other chronic sinusitis     Panic attack        Family History   Problem Relation Age of Onset    Colon cancer Mother 30       Social History     Socioeconomic History    Marital status:      Spouse name: Not on file    Number of children: Not on file    Years of education: Not on file    Highest education level: Not on file   Occupational History    Not on file   Social Needs    Financial resource strain: Not on file    Food insecurity     Worry: Not on file     Inability: Not on file    Transportation needs     Medical: Not on file     Non-medical: Not on file   Tobacco Use    Smoking status: Current Some Day Smoker     Packs/day: 0.50     Years:  27.00     Pack years: 13.50     Types: Cigarettes    Smokeless tobacco: Never Used    Tobacco comment: No smoking after m.n prior to sx   Substance and Sexual Activity    Alcohol use: No    Drug use: No    Sexual activity: Not on file   Lifestyle    Physical activity     Days per week: Not on file     Minutes per session: Not on file    Stress: Not on file   Relationships    Social connections     Talks on phone: Not on file     Gets together: Not on file     Attends Orthodox service: Not on file     Active member of club or organization: Not on file     Attends meetings of clubs or organizations: Not on file     Relationship status: Not on file   Other Topics Concern    Not on file   Social History Narrative           Past Surgical History:   Procedure Laterality Date    APPENDECTOMY      BLADDER SURGERY      multiple repairs after tears during ex lap     SECTION, LOW TRANSVERSE      CHOLECYSTECTOMY      CYSTOTOMY      with repair at time of     FOOT NEUROMA SURGERY Right     HYSTERECTOMY      ID EXPLORATORY OF ABDOMEN      Multiple with lysis of adhesions x's 13    SHOULDER SURGERY      right    TYMPANOSTOMY TUBE PLACEMENT Left        Review of Systems   Constitutional: Negative for chills, fatigue and fever.   HENT: Negative for hearing loss.    Eyes: Negative for photophobia and visual disturbance.   Respiratory: Negative for cough, chest tightness, shortness of breath and wheezing.    Cardiovascular: Negative for chest pain and palpitations.   Gastrointestinal: Negative for constipation, diarrhea, nausea and vomiting.   Endocrine: Negative for cold intolerance and heat intolerance.   Genitourinary: Negative for flank pain.   Musculoskeletal: Positive for arthralgias, back pain and gait problem. Negative for neck pain and neck stiffness.   Skin: Negative for wound.   Neurological: Negative for light-headedness and headaches.        Neuritis, right foot  "  Psychiatric/Behavioral: Negative for sleep disturbance.          Objective:   /72   Pulse 87   Ht 5' 6" (1.676 m)   Wt 83 kg (182 lb 15.7 oz)   BMI 29.53 kg/m²          LOWER EXTREMITY PHYSICAL EXAMINATION  VASCULAR: On the right foot, the dorsalis pedis pulse is 2/4 and the posterior tibial pulse is 2/4. Capillary refill time is less than 3 seconds. Hair growth is present on the dorsum of the foot and at the digits. No rubor is present. Proximal to distal temperature is warm to warm.    NEUROLOGY: Sensation to light touch is intact. Proprioception is intact. Sensation to pin prick is intact. Deep tendon reflexes of the lower extremity are WNL.     DERMATOLOGY: Skin is supple, dry and intact. No ecchymosis is noted. No erythema or cellulitis is noted.    ORTHOPEDIC: Manual Muscle Testing is 5/5 in all planes on the right, without pains, with and without resistance.  Discomfort to palpation at the dorsal aspect of the 3rd metatarsal bone.  Mild edema is noted.  Gait pattern is antalgic.    Assessment:     1. Stress fracture of metatarsal bone of right foot, sequela    2. Vitamin D insufficiency    3. Pain in right foot    4. Multiple sclerosis    5. Tobacco abuse        Plan:     Stress fracture of metatarsal bone of right foot, sequela    Vitamin D insufficiency    Pain in right foot    Multiple sclerosis    Tobacco abuse      Thorough discussion is had with the patient today, concerning the diagnosis, its etiology, and the treatment algorithm at present.  CT Scan is reviewed in detail with the patient. All questions and concerns regarding findings and its/their implications are outlined and discussed.    Most recent labs reviewed in detail with the patient. All questions and concerns regarding findings and its/their implications are outlined and discussed.  Patient's past medical history is thoroughly reviewed today, in light of the fact that surgical intervention is discussed/planned/initiated.    The " procedure of (open reduction internal fixation of right 3rd metatarsal stress fracture with bone marrow augmentation) was thoroughly explained to the patient. Its necessity and/or purpose and the implications therein were outlined, including any pertinent advantages and/or disadvantages, and possible complications, if any. Possible complications include recurrence of pathology and/or deformity, infection (cellulitis, drainage, purulence, malodor, etc...), pain, numbness, neuritis, edema, burning, loss of function, need for further surgery, possible need for removal of any implanted hardware, soft tissue contracture and/or scarring, etc... No guarantees were given and/or implied. Post-operative expectations and weightbearing protocol is thoroughly explained the patient, who acknowledges understanding.             Future Appointments   Date Time Provider Department Center   6/26/2020  3:30 PM COVID TESTING, [facility]

## 2020-06-25 NOTE — TELEPHONE ENCOUNTER
----- Message from Belle Devries sent at 6/25/2020  1:41 PM CDT -----  Contact: Diana CADENA [phone]  Diana is returning your call.  Please advise.

## 2020-06-25 NOTE — TELEPHONE ENCOUNTER
Spoke with pt about when to  her medication. Please advise.      Michelle Sales MA      ----- Message from Genny Maya sent at 6/25/2020  4:17 PM CDT -----  Pt called with questions about the medication pt wants to know if she needs to pick it up now or after surgery please reach out to pt on this matter at [phone]

## 2020-06-25 NOTE — PROGRESS NOTES
Subjective:       Patient ID: Mary Jo Chan is a 49 y.o. female.    Chief Complaint: Follow-up (surgery consent rates pain 5/10 walking boot non diabetic pt pcp Dr. Guzman.)    HPI: Mary Jo Chan presents to the clinic today, for follow up concerning discomfort at the right foot at the 3rd.  Patient did have recent MRI which did show stress fracture of the distal 3rd metatarsal metaphysis.  Patient presents afternoon to discuss possible surgical intervention.  States her pains are consistent 5/10.  She does have a history of multiple sclerosis as well as tobacco abuse.  Patient presents ambulatory with a walking boot.    Review of patient's allergies indicates:   Allergen Reactions    Prozac [fluoxetine] Other (See Comments)     Pt states she is intolerant of all antidepressants-makes her forget who or where she is    Adhesive tape-silicones Dermatitis     Skin sluffs off, burns skin    Powder scent fragrance Rash     Powder from gloves       Past Medical History:   Diagnosis Date    Anxiety     with severe panic attacks    Chronic fatigue     Depression     Disc degeneration     Disc degeneration     Diverticulosis     Herniated disc     IBS (irritable bowel syndrome)     IgG deficiency     Multiple sclerosis     Osteoarthritis     Other chronic sinusitis     Panic attack        Family History   Problem Relation Age of Onset    Colon cancer Mother 30       Social History     Socioeconomic History    Marital status:      Spouse name: Not on file    Number of children: Not on file    Years of education: Not on file    Highest education level: Not on file   Occupational History    Not on file   Social Needs    Financial resource strain: Not on file    Food insecurity     Worry: Not on file     Inability: Not on file    Transportation needs     Medical: Not on file     Non-medical: Not on file   Tobacco Use    Smoking status: Current Some Day Smoker     Packs/day: 0.50     Years:  27.00     Pack years: 13.50     Types: Cigarettes    Smokeless tobacco: Never Used    Tobacco comment: No smoking after m.n prior to sx   Substance and Sexual Activity    Alcohol use: No    Drug use: No    Sexual activity: Not on file   Lifestyle    Physical activity     Days per week: Not on file     Minutes per session: Not on file    Stress: Not on file   Relationships    Social connections     Talks on phone: Not on file     Gets together: Not on file     Attends Restorationism service: Not on file     Active member of club or organization: Not on file     Attends meetings of clubs or organizations: Not on file     Relationship status: Not on file   Other Topics Concern    Not on file   Social History Narrative           Past Surgical History:   Procedure Laterality Date    APPENDECTOMY      BLADDER SURGERY      multiple repairs after tears during ex lap     SECTION, LOW TRANSVERSE      CHOLECYSTECTOMY      CYSTOTOMY      with repair at time of     FOOT NEUROMA SURGERY Right     HYSTERECTOMY      MD EXPLORATORY OF ABDOMEN      Multiple with lysis of adhesions x's 13    SHOULDER SURGERY      right    TYMPANOSTOMY TUBE PLACEMENT Left        Review of Systems   Constitutional: Negative for chills, fatigue and fever.   HENT: Negative for hearing loss.    Eyes: Negative for photophobia and visual disturbance.   Respiratory: Negative for cough, chest tightness, shortness of breath and wheezing.    Cardiovascular: Negative for chest pain and palpitations.   Gastrointestinal: Negative for constipation, diarrhea, nausea and vomiting.   Endocrine: Negative for cold intolerance and heat intolerance.   Genitourinary: Negative for flank pain.   Musculoskeletal: Positive for arthralgias, back pain and gait problem. Negative for neck pain and neck stiffness.   Skin: Negative for wound.   Neurological: Negative for light-headedness and headaches.        Neuritis, right foot  "  Psychiatric/Behavioral: Negative for sleep disturbance.          Objective:   /72   Pulse 87   Ht 5' 6" (1.676 m)   Wt 83 kg (182 lb 15.7 oz)   BMI 29.53 kg/m²          LOWER EXTREMITY PHYSICAL EXAMINATION  VASCULAR: On the right foot, the dorsalis pedis pulse is 2/4 and the posterior tibial pulse is 2/4. Capillary refill time is less than 3 seconds. Hair growth is present on the dorsum of the foot and at the digits. No rubor is present. Proximal to distal temperature is warm to warm.    NEUROLOGY: Sensation to light touch is intact. Proprioception is intact. Sensation to pin prick is intact. Deep tendon reflexes of the lower extremity are WNL.     DERMATOLOGY: Skin is supple, dry and intact. No ecchymosis is noted. No erythema or cellulitis is noted.    ORTHOPEDIC: Manual Muscle Testing is 5/5 in all planes on the right, without pains, with and without resistance.  Discomfort to palpation at the dorsal aspect of the 3rd metatarsal bone.  Mild edema is noted.  Gait pattern is antalgic.    Assessment:     1. Stress fracture of metatarsal bone of right foot, sequela    2. Vitamin D insufficiency    3. Pain in right foot    4. Multiple sclerosis    5. Tobacco abuse        Plan:     Stress fracture of metatarsal bone of right foot, sequela    Vitamin D insufficiency    Pain in right foot    Multiple sclerosis    Tobacco abuse      Thorough discussion is had with the patient today, concerning the diagnosis, its etiology, and the treatment algorithm at present.  CT Scan is reviewed in detail with the patient. All questions and concerns regarding findings and its/their implications are outlined and discussed.    Most recent labs reviewed in detail with the patient. All questions and concerns regarding findings and its/their implications are outlined and discussed.  Patient's past medical history is thoroughly reviewed today, in light of the fact that surgical intervention is discussed/planned/initiated.    The " procedure of (open reduction internal fixation of right 3rd metatarsal stress fracture with bone marrow augmentation) was thoroughly explained to the patient. Its necessity and/or purpose and the implications therein were outlined, including any pertinent advantages and/or disadvantages, and possible complications, if any. Possible complications include recurrence of pathology and/or deformity, infection (cellulitis, drainage, purulence, malodor, etc...), pain, numbness, neuritis, edema, burning, loss of function, need for further surgery, possible need for removal of any implanted hardware, soft tissue contracture and/or scarring, etc... No guarantees were given and/or implied. Post-operative expectations and weightbearing protocol is thoroughly explained the patient, who acknowledges understanding.             Future Appointments   Date Time Provider Department Center   6/26/2020  3:30 PM COVID TESTING, [facility]

## 2020-06-26 ENCOUNTER — TELEPHONE (OUTPATIENT)
Dept: PODIATRY | Facility: CLINIC | Age: 50
End: 2020-06-26

## 2020-06-26 ENCOUNTER — LAB VISIT (OUTPATIENT)
Dept: OTOLARYNGOLOGY | Facility: CLINIC | Age: 50
End: 2020-06-26
Payer: COMMERCIAL

## 2020-06-26 DIAGNOSIS — Z01.818 PREOP TESTING: ICD-10-CM

## 2020-06-26 PROCEDURE — U0003 INFECTIOUS AGENT DETECTION BY NUCLEIC ACID (DNA OR RNA); SEVERE ACUTE RESPIRATORY SYNDROME CORONAVIRUS 2 (SARS-COV-2) (CORONAVIRUS DISEASE [COVID-19]), AMPLIFIED PROBE TECHNIQUE, MAKING USE OF HIGH THROUGHPUT TECHNOLOGIES AS DESCRIBED BY CMS-2020-01-R: HCPCS

## 2020-06-26 NOTE — TELEPHONE ENCOUNTER
Spoke with patient, and she will come get her test done today at 3:30pm      Michelle Sales MA         ----- Message from Naren Arriola sent at 6/26/2020 11:30 AM CDT -----  Regarding: Covid testing//  Contact: Pt  Pt is calling the staff regarding the pt was told by the staff on yesterday to come and get a covid test today.    Pt call back [phone]    Thanks

## 2020-06-27 LAB — SARS-COV-2 RNA RESP QL NAA+PROBE: NOT DETECTED

## 2020-06-28 NOTE — DISCHARGE INSTRUCTIONS
Weightbearing Status and Wound care:  1. When walking, wear the surgical shoe or walking boot at all times.  2. During daytime/awake hours, if a CAST or POSTERIOR SPLINT is in place, ice the posterior aspect of the leg, behind the knee, 20 minutes on (w/ ice) and followed by 20 minutes off (w/o ice) until follow up; repeat accordingly until follow up. IF no CAST or POSTERIOR SPLINT is in place, ice the anterior aspect of the ankle, in a similar manner. During night time/sleep hours, simply elevating the limb above the level of the heart is sufficient.   3. Elevate the extremity above the level of the heart at all times when sitting.  4. Take the pain mediations as prescribed for the initial 3 or so days, then only as needed.  5. If no overt medical contra-indication, take Motrin or Advil or Ibuprofen or Aleve in between the pain medication doses.  7. Do not change the dressings.  8. Do not get the dressings wet.

## 2020-06-29 ENCOUNTER — HOSPITAL ENCOUNTER (OUTPATIENT)
Facility: HOSPITAL | Age: 50
Discharge: HOME | End: 2020-06-29
Attending: PODIATRIST | Admitting: PODIATRIST
Payer: COMMERCIAL

## 2020-06-29 ENCOUNTER — ANESTHESIA (OUTPATIENT)
Dept: SURGERY | Facility: HOSPITAL | Age: 50
End: 2020-06-29
Payer: COMMERCIAL

## 2020-06-29 ENCOUNTER — ANESTHESIA EVENT (OUTPATIENT)
Dept: SURGERY | Facility: HOSPITAL | Age: 50
End: 2020-06-29
Payer: COMMERCIAL

## 2020-06-29 DIAGNOSIS — M84.374A METATARSAL STRESS FRACTURE OF RIGHT FOOT: ICD-10-CM

## 2020-06-29 PROCEDURE — 20999 UNLISTED PX MUSCSKEL GENERAL: CPT | Mod: ,,, | Performed by: PODIATRIST

## 2020-06-29 PROCEDURE — 36000708 HC OR TIME LEV III 1ST 15 MIN: Performed by: PODIATRIST

## 2020-06-29 PROCEDURE — 37000009 HC ANESTHESIA EA ADD 15 MINS: Performed by: PODIATRIST

## 2020-06-29 PROCEDURE — 25000003 PHARM REV CODE 250: Performed by: PODIATRIST

## 2020-06-29 PROCEDURE — 71000015 HC POSTOP RECOV 1ST HR: Performed by: PODIATRIST

## 2020-06-29 PROCEDURE — 63600175 PHARM REV CODE 636 W HCPCS: Performed by: NURSE ANESTHETIST, CERTIFIED REGISTERED

## 2020-06-29 PROCEDURE — 71000033 HC RECOVERY, INTIAL HOUR: Performed by: PODIATRIST

## 2020-06-29 PROCEDURE — 28485 PR OPEN TREATMENT METATARSAL FRACTURE EACH: ICD-10-PCS | Mod: T7,,, | Performed by: PODIATRIST

## 2020-06-29 PROCEDURE — C1713 ANCHOR/SCREW BN/BN,TIS/BN: HCPCS | Performed by: PODIATRIST

## 2020-06-29 PROCEDURE — 27201423 OPTIME MED/SURG SUP & DEVICES STERILE SUPPLY: Performed by: PODIATRIST

## 2020-06-29 PROCEDURE — 63600175 PHARM REV CODE 636 W HCPCS: Performed by: PODIATRIST

## 2020-06-29 PROCEDURE — 27800903 OPTIME MED/SURG SUP & DEVICES OTHER IMPLANTS: Performed by: PODIATRIST

## 2020-06-29 PROCEDURE — 28485 OPTX METATARSAL FX EACH: CPT | Mod: T7,,, | Performed by: PODIATRIST

## 2020-06-29 PROCEDURE — 25000003 PHARM REV CODE 250: Performed by: ANESTHESIOLOGY

## 2020-06-29 PROCEDURE — 37000008 HC ANESTHESIA 1ST 15 MINUTES: Performed by: PODIATRIST

## 2020-06-29 PROCEDURE — 63600175 PHARM REV CODE 636 W HCPCS: Performed by: ANESTHESIOLOGY

## 2020-06-29 PROCEDURE — 36000709 HC OR TIME LEV III EA ADD 15 MIN: Performed by: PODIATRIST

## 2020-06-29 PROCEDURE — S0020 INJECTION, BUPIVICAINE HYDRO: HCPCS | Performed by: PODIATRIST

## 2020-06-29 PROCEDURE — 20999 BONE MARROW ASPIRATION FOR BONE GRAFTING: ICD-10-PCS | Mod: ,,, | Performed by: PODIATRIST

## 2020-06-29 DEVICE — IMPLANTABLE DEVICE: Type: IMPLANTABLE DEVICE | Site: FOOT | Status: FUNCTIONAL

## 2020-06-29 RX ORDER — SODIUM CHLORIDE, SODIUM LACTATE, POTASSIUM CHLORIDE, CALCIUM CHLORIDE 600; 310; 30; 20 MG/100ML; MG/100ML; MG/100ML; MG/100ML
INJECTION, SOLUTION INTRAVENOUS CONTINUOUS PRN
Status: DISCONTINUED | OUTPATIENT
Start: 2020-06-29 | End: 2020-06-29

## 2020-06-29 RX ORDER — KETOROLAC TROMETHAMINE 30 MG/ML
15 INJECTION, SOLUTION INTRAMUSCULAR; INTRAVENOUS EVERY 8 HOURS PRN
Status: DISCONTINUED | OUTPATIENT
Start: 2020-06-29 | End: 2020-06-29 | Stop reason: HOSPADM

## 2020-06-29 RX ORDER — HEPARIN SODIUM 1000 [USP'U]/ML
INJECTION INTRAVENOUS; SUBCUTANEOUS
Status: DISCONTINUED | OUTPATIENT
Start: 2020-06-29 | End: 2020-06-29 | Stop reason: HOSPADM

## 2020-06-29 RX ORDER — OXYCODONE AND ACETAMINOPHEN 5; 325 MG/1; MG/1
1 TABLET ORAL
Status: DISCONTINUED | OUTPATIENT
Start: 2020-06-29 | End: 2020-06-29 | Stop reason: HOSPADM

## 2020-06-29 RX ORDER — BUPIVACAINE HYDROCHLORIDE 5 MG/ML
INJECTION, SOLUTION EPIDURAL; INTRACAUDAL
Status: DISCONTINUED | OUTPATIENT
Start: 2020-06-29 | End: 2020-06-29 | Stop reason: HOSPADM

## 2020-06-29 RX ORDER — FENTANYL CITRATE 50 UG/ML
INJECTION, SOLUTION INTRAMUSCULAR; INTRAVENOUS
Status: DISCONTINUED | OUTPATIENT
Start: 2020-06-29 | End: 2020-06-29

## 2020-06-29 RX ORDER — MIDAZOLAM HYDROCHLORIDE 1 MG/ML
INJECTION, SOLUTION INTRAMUSCULAR; INTRAVENOUS
Status: DISCONTINUED | OUTPATIENT
Start: 2020-06-29 | End: 2020-06-29

## 2020-06-29 RX ORDER — CLINDAMYCIN PHOSPHATE 900 MG/50ML
900 INJECTION, SOLUTION INTRAVENOUS
Status: COMPLETED | OUTPATIENT
Start: 2020-06-29 | End: 2020-06-29

## 2020-06-29 RX ORDER — LIDOCAINE HYDROCHLORIDE 20 MG/ML
INJECTION INTRAVENOUS
Status: DISCONTINUED | OUTPATIENT
Start: 2020-06-29 | End: 2020-06-29

## 2020-06-29 RX ORDER — ONDANSETRON 2 MG/ML
4 INJECTION INTRAMUSCULAR; INTRAVENOUS DAILY PRN
Status: DISCONTINUED | OUTPATIENT
Start: 2020-06-29 | End: 2020-06-29 | Stop reason: HOSPADM

## 2020-06-29 RX ORDER — LIDOCAINE HYDROCHLORIDE AND EPINEPHRINE 10; 10 MG/ML; UG/ML
INJECTION, SOLUTION INFILTRATION; PERINEURAL
Status: DISCONTINUED | OUTPATIENT
Start: 2020-06-29 | End: 2020-06-29 | Stop reason: HOSPADM

## 2020-06-29 RX ORDER — HYDROMORPHONE HYDROCHLORIDE 2 MG/ML
0.2 INJECTION, SOLUTION INTRAMUSCULAR; INTRAVENOUS; SUBCUTANEOUS EVERY 5 MIN PRN
Status: COMPLETED | OUTPATIENT
Start: 2020-06-29 | End: 2020-06-29

## 2020-06-29 RX ORDER — PROPOFOL 10 MG/ML
VIAL (ML) INTRAVENOUS
Status: DISCONTINUED | OUTPATIENT
Start: 2020-06-29 | End: 2020-06-29

## 2020-06-29 RX ORDER — ONDANSETRON 2 MG/ML
INJECTION INTRAMUSCULAR; INTRAVENOUS
Status: DISCONTINUED | OUTPATIENT
Start: 2020-06-29 | End: 2020-06-29

## 2020-06-29 RX ADMIN — HYDROMORPHONE HYDROCHLORIDE 0.2 MG: 2 INJECTION, SOLUTION INTRAMUSCULAR; INTRAVENOUS; SUBCUTANEOUS at 02:06

## 2020-06-29 RX ADMIN — PROPOFOL 150 MG: 10 INJECTION, EMULSION INTRAVENOUS at 12:06

## 2020-06-29 RX ADMIN — HYDROMORPHONE HYDROCHLORIDE 0.2 MG: 2 INJECTION, SOLUTION INTRAMUSCULAR; INTRAVENOUS; SUBCUTANEOUS at 01:06

## 2020-06-29 RX ADMIN — CLINDAMYCIN PHOSPHATE 900 MG: 18 INJECTION, SOLUTION INTRAVENOUS at 12:06

## 2020-06-29 RX ADMIN — FENTANYL CITRATE 50 MCG: 50 INJECTION, SOLUTION INTRAMUSCULAR; INTRAVENOUS at 12:06

## 2020-06-29 RX ADMIN — MIDAZOLAM 2 MG: 1 INJECTION INTRAMUSCULAR; INTRAVENOUS at 12:06

## 2020-06-29 RX ADMIN — Medication 100 MG: at 12:06

## 2020-06-29 RX ADMIN — KETOROLAC TROMETHAMINE 15 MG: 30 INJECTION, SOLUTION INTRAMUSCULAR at 01:06

## 2020-06-29 RX ADMIN — OXYCODONE HYDROCHLORIDE AND ACETAMINOPHEN 1 TABLET: 5; 325 TABLET ORAL at 02:06

## 2020-06-29 RX ADMIN — ONDANSETRON 4 MG: 2 INJECTION, SOLUTION INTRAMUSCULAR; INTRAVENOUS at 12:06

## 2020-06-29 RX ADMIN — SODIUM CHLORIDE, SODIUM LACTATE, POTASSIUM CHLORIDE, AND CALCIUM CHLORIDE: .6; .31; .03; .02 INJECTION, SOLUTION INTRAVENOUS at 12:06

## 2020-06-29 NOTE — INTERVAL H&P NOTE
The patient has been examined and the H&P has been reviewed:    I concur with the findings and no changes have occurred since H&P was written.    Anesthesia/Surgery risks, benefits and alternative options discussed and understood by patient/family.          Active Hospital Problems    Diagnosis  POA    Metatarsal stress fracture of right foot [M84.374A]  Yes      Resolved Hospital Problems   No resolved problems to display.

## 2020-06-29 NOTE — OP NOTE
[facility]  Podiatric Medicine & Surgery  Operative Report    SUMMARY     Date of Procedure: 6/29/2020    Procedure: Procedure(s):  ORIF, FRACTURE, METATARSAL BONE  ASPIRATION, BONE MARROW    Surgeon(s) and Role: Surgeon(s) and Role:     * Richie Dunn DPM - Primary    Pre-Operative Diagnosis: Pre-Op Diagnosis Codes:     * Metatarsal stress fracture, right, sequela [M84.374S]    Post-Operative Diagnosis: Post-Op Diagnosis Codes:     * Metatarsal stress fracture, right, sequela [M84.374S]    Anesthesia: General    Technical Procedures Used:   1. ORIF of right 3rd metatarsal fracture.   2. Bone Marrow Aspiration, right tibia.    Description of the Findings of the Procedure: The patient was seen in the Holding Room. The risks, benefits, complications, treatment options, and expected outcomes were discussed with the patient. The risks and potential complications of their problem and purposed treatment include but are not limited to infection, nerve injury, vascular injury, nonunion/malunion/delayed union of the surgical site, persistent pain, potential skin necrosis, deep vein thrombosis, possible pulmonary embolus, complications of the anesthetics and failure of the implant.  The patient concurred with the proposed plan, giving informed consent. The patient is aware that the procedure may be a part of a staged collection of procedures for definitive cure and/or alleviation of symptoms. The site of surgery properly noted/marked. Preoperative intravenous antibiotics are hanging at bedside, and are currently being administered via the heparin lock. The patient was taken to Operating Suite.    Once in the operative suite, the patient is transferred onto the operative table in the supine position. A TIME-OUT is taken as per protocol to identify the proper patient, procedure to be performed, and laterality.  The patient is properly positioned on the operating room table for ease of dissection  and for any ancillary imaging. Nursing and ancillary OR staff prepared the patient for the procedure. The patient is adequately sedated by the Attending Anesthesiologist and/or covering CRNA.  Following this, a preoperative regional/local block was given to the proximal aspect of the 2nd-4th TMTJ with 20cc of 1% Lidocaine w/ Epinephrine and 10cc of 1% Lidocaine with Epinephrine proximal to the area of the BMAC site.  Next, the operative limb was rendered sterile using chlorhexidine paint and scrub.  Sterile sheets and drapes were applied thereafter. Another TIME-OUT is taken as per protocol to identify the proper patient, procedure to be performed, and laterality.      At this time, sharp incision with a scalpel blade atop the anterior-medi aspect of the proximal tibia. The incision is approximately 1cm in length. The skin incision is taken down to bone with the assistance of a sharp tenotomy scissor. Once down to bone, the Bone Marrow Aspirate Needle is inserted into the bone and approximately 30cc - 40cc blood/marrow contents are drawn off. After successful aspiration, the quantity of the 30cc - 40cc is handed off the sterile surgical field and to the back table and off to a non-sterile field for centrifugation. Prior to final fixation of the planned osteotomy or fusion and/or skin closure, the BMAC will be injected into the site of concern, pathology, fusion/osteotomy.  The skin is closed in standard fashion.    This, sharp skin incision atop the 3rd metatarsal bone.  Deep dissection with tenotomy scissor.  Electrocautery as necessitated.  The extensor tendons are retracted. Periosteal stripping with a Key elevator.  The fracture site identify and is debrided with a dental pick.  Of note, the fracture site is spiral and does encompass the medial and lateral aspect of the bone, proximal and distal.  This area is then back filled using DBM putty.  At this time, application of a [id number] straight plate.   Fluoroscopy confirmed anatomic reduction and alignment.  Further augmentation with DBM putty.  Deep closure using 0 Vicryl suture.  Skin is closed using 3-0 Nylon.  Injection of 10cc the bone marrow aspirate around the fracture site. Injection of 10cc of 0.50% Marcaine plain. All incisions are dressed with Xeroform/Adaptic nonadherent dressings followed by sterile 4 x 4 gauze, abdominal pad, Natan/Kerlix and light ACE. Patient fitted with CAM Walker.    The anesthesia is weaned. There were no complications to this procedure. Any final necessary imaging to be performed in the PACU if it was not performed here in the OR suite.  The patient is transferred to the [facility] bed/stretcher, [facility]. The patient is transferred to the PACU.    Significant Surgical Tasks Conducted by the Assistant(s), if Applicable: N/A    Complications: * No complications entered in OR log *    Estimated Blood Loss (EBL): Minimal    Drains: N/A    Implants:   Implant Name Type Inv. Item Serial No.  Lot No. LRB No. Used Action   [id number]  [employer] [place] 100 DBM INJECTABLE [id number] [employer] N/A Right 1 Implanted   STRAIGHT PLATE 6 HHOLE -[id number]   N/A  N/A Right 1 Implanted   2.5 X 10 LOCKING SCREW   N/A  N/A Right 3 Implanted   2.5 X 12 LOCKING SCREW   N/A  N/A Right 1 Implanted   2.5 X 15 LOCKING SCREW   N/A  N/A Right 1 Implanted       Specimens: * No specimens in log *    Condition: stable    Disposition: PACU - hemodynamically stable.    Attestation: I was present and scrubbed for the entire procedure.

## 2020-06-29 NOTE — TRANSFER OF CARE
"Anesthesia Transfer of Care Note    Patient: April M Salomónng    Procedure(s) Performed: Procedure(s) (LRB):  ORIF, FRACTURE, METATARSAL BONE (Right)  ASPIRATION, BONE MARROW (Right)    Patient location: PACU    Anesthesia Type: general    Transport from OR: Transported from OR on room air with adequate spontaneous ventilation    Post pain: adequate analgesia    Post assessment: no apparent anesthetic complications and tolerated procedure well    Post vital signs: stable    Level of consciousness: awake    Nausea/Vomiting: no nausea/vomiting    Complications: none    Transfer of care protocol was followed      Last vitals:   Visit Vitals  /68   Pulse 81   Temp 36.7 °C (98.1 °F) (Temporal)   Resp 16   Ht 5' 6" (1.676 m)   Wt 82.5 kg (181 lb 12.3 oz)   SpO2 98%   Breastfeeding No   BMI 29.34 kg/m²     "

## 2020-06-29 NOTE — ANESTHESIA POSTPROCEDURE EVALUATION
Anesthesia Post Evaluation    Patient: April M Napoleonhing    Procedure(s) Performed: Procedure(s) (LRB):  ORIF, FRACTURE, METATARSAL BONE (Right)  ASPIRATION, BONE MARROW (Right)    Final Anesthesia Type: general    Patient location during evaluation: PACU  Patient participation: Yes- Able to Participate  Level of consciousness: awake  Post-procedure vital signs: reviewed and stable  Pain management: adequate  Airway patency: patent    PONV status at discharge: No PONV  Anesthetic complications: no      Cardiovascular status: blood pressure returned to baseline and hemodynamically stable  Respiratory status: unassisted and spontaneous ventilation  Hydration status: euvolemic  Follow-up not needed.          Vitals Value Taken Time     06/29/20 1347   Temp  06/29/20 1347   Pulse 105 06/29/20 1346   Resp 26 06/29/20 1346   SpO2 94 % 06/29/20 1346   Vitals shown include unvalidated device data.      No case tracking events are documented in the log.      Pain/Melecio Score: No data recorded

## 2020-06-29 NOTE — ANESTHESIA PREPROCEDURE EVALUATION
2020 VIKTORIA Chan is a 49 y.o., female.    Anesthesia Evaluation    I have reviewed the Patient Summary Reports.    I have reviewed the Nursing Notes.    I have reviewed the Medications.     Review of Systems  Anesthesia Hx:  No problems with previous Anesthesia    Social:  Smoker    Hematology/Oncology:  Hematology Normal        Cardiovascular:  Cardiovascular Normal     Pulmonary:  Pulmonary Normal    Renal/:  Renal/ Normal     Hepatic/GI:  Hepatic/GI Normal IBS (irritable bowel syndrome)   Musculoskeletal:   Osteoarthritis  Disc degeneration  Chronic fatigue   Neurological:  Neurology Normal Multiple sclerosis   Endocrine:  Endocrine Normal    Psych:   anxiety depression Panic attack       Patient Active Problem List   Diagnosis    Chronic pain    Nausea and vomiting in adult patient    Metatarsal stress fracture of right foot     No current facility-administered medications on file prior to encounter.      Current Outpatient Medications on File Prior to Encounter   Medication Sig Dispense Refill    acetaminophen (TYLENOL) 500 MG tablet Take 500 mg by mouth every 6 (six) hours as needed for Pain.      atorvastatin (LIPITOR) 20 MG tablet Take 20 mg by mouth once daily.      baclofen (LIORESAL) 10 MG tablet Take 10 mg by mouth 3 (three) times daily.      dimethyl fumarate (TECFIDERA) 240 mg CpDR Take 240 mg by mouth 2 (two) times daily.      LORazepam (ATIVAN) 1 MG tablet Take 1 mg by mouth every 6 (six) hours as needed for Anxiety.      meloxicam (MOBIC) 7.5 MG tablet Take 7.5 mg by mouth once daily.      ondansetron (ZOFRAN-ODT) 8 MG TbDL       pyridoxine HCl, vitamin B6, (VITAMIN B-6 ORAL) Take by mouth once daily.       Past Surgical History:   Procedure Laterality Date    APPENDECTOMY      BLADDER SURGERY      multiple repairs after tears during ex lap     SECTION,  LOW TRANSVERSE      CHOLECYSTECTOMY      CYSTOTOMY      with repair at time of     FOOT NEUROMA SURGERY Right     HYSTERECTOMY      [place] EXPLORATORY OF ABDOMEN      Multiple with lysis of adhesions x's 13    SHOULDER SURGERY      right    TYMPANOSTOMY TUBE PLACEMENT Left          Physical Exam  General:  Well nourished    Airway/Jaw/Neck:  Airway Findings: Mouth Opening: Normal Tongue: Normal  General Airway Assessment: Adult  Mallampati: II  TM Distance: 4 - 6 cm  Jaw/Neck Findings:  Neck ROM: Normal ROM      Dental:  Dental Findings: Edentulous   Chest/Lungs:  Chest/Lungs Findings: Clear to auscultation, Normal Respiratory Rate     Heart/Vascular:  Heart Findings: Rate: Normal  Rhythm: Regular Rhythm  Sounds: Normal        Mental Status:  Mental Status Findings:  Cooperative, Alert and Oriented         Anesthesia Plan  Type of Anesthesia, risks & benefits discussed:  Anesthesia Type:  MAC, general  Patient's Preference:   Intra-op Monitoring Plan: standard ASA monitors  Intra-op Monitoring Plan Comments:   Post Op Pain Control Plan: multimodal analgesia and per primary service following discharge from PACU  Post Op Pain Control Plan Comments:   Induction:   IV  Beta Blocker:  Patient is not currently on a Beta-Blocker (No further documentation required).       Informed Consent: Patient understands risks and agrees with Anesthesia plan.  Questions answered. Anesthesia consent signed with patient.  ASA Score: 2     Day of Surgery Review of History & Physical:  There are no significant changes.          Ready For Surgery From Anesthesia Perspective.       Chemistry        Component Value Date/Time     2020 1435    K 4.4 2020 1435     2020 1435    CO2 26 2020 1435    BUN 7 2020 1435    CREATININE 0.8 2020 1435    GLU 94 2020 1435        Component Value Date/Time    CALCIUM 10.7 (H) 2020 1435    ALKPHOS 97 2020 1215    AST 9 (L) 2020  1215    ALT 8 (L) 01/31/2020 1215    BILITOT 0.7 01/31/2020 1215    ESTGFRAFRICA >60.0 06/24/2020 1435    EGFRNONAA >60.0 06/24/2020 1435        Lab Results   Component Value Date    WBC 7.85 06/24/2020    HGB 12.3 06/24/2020    HCT 38.5 06/24/2020     (H) 06/24/2020     06/24/2020

## 2020-06-29 NOTE — BRIEF OP NOTE
[facility]  Brief Operative Note    Surgery Date: 6/29/2020     Surgeon(s) and Role:     * Richie Dunn DPM - Primary    Assisting Surgeon: None    Pre-op Diagnosis:  Metatarsal stress fracture, right, sequela [M84.374S]    Post-op Diagnosis:  Post-Op Diagnosis Codes:     * Metatarsal stress fracture, right, sequela [M84.374S]    Procedure(s) (LRB):  ORIF, FRACTURE, METATARSAL BONE (Right)  ASPIRATION, BONE MARROW (Right)    Anesthesia: General    Description of the findings of the procedure(s):    1. ORIF of right 3rd metatarsal fracture.   2. Bone Marrow Aspiration, right tibia.    Estimated Blood Loss: 5 mL         Specimens:   Specimen (12h ago, onward)    None            Discharge Note    OUTCOME: Patient tolerated treatment/procedure well without complication and is now ready for discharge.    DISPOSITION: Home or Self Care    FINAL DIAGNOSIS:  <principal problem not specified>    FOLLOWUP: In clinic    DISCHARGE INSTRUCTIONS:  No discharge procedures on file.

## 2020-07-13 VITALS
WEIGHT: 181.75 LBS | OXYGEN SATURATION: 97 % | DIASTOLIC BLOOD PRESSURE: 58 MMHG | SYSTOLIC BLOOD PRESSURE: 106 MMHG | TEMPERATURE: 98 F | BODY MASS INDEX: 29.21 KG/M2 | HEIGHT: 66 IN | RESPIRATION RATE: 16 BRPM | HEART RATE: 83 BPM

## 2020-07-14 ENCOUNTER — OFFICE VISIT (OUTPATIENT)
Dept: PODIATRY | Facility: CLINIC | Age: 50
End: 2020-07-14
Payer: COMMERCIAL

## 2020-07-14 VITALS
DIASTOLIC BLOOD PRESSURE: 84 MMHG | RESPIRATION RATE: 17 BRPM | HEART RATE: 96 BPM | BODY MASS INDEX: 29.23 KG/M2 | HEIGHT: 66 IN | SYSTOLIC BLOOD PRESSURE: 120 MMHG | WEIGHT: 181.88 LBS

## 2020-07-14 DIAGNOSIS — M79.671 PAIN IN RIGHT FOOT: ICD-10-CM

## 2020-07-14 DIAGNOSIS — M84.374S STRESS FRACTURE OF METATARSAL BONE OF RIGHT FOOT, SEQUELA: ICD-10-CM

## 2020-07-14 DIAGNOSIS — Z72.0 TOBACCO ABUSE: ICD-10-CM

## 2020-07-14 DIAGNOSIS — Z09 POSTOP CHECK: Primary | ICD-10-CM

## 2020-07-14 DIAGNOSIS — E55.9 VITAMIN D INSUFFICIENCY: ICD-10-CM

## 2020-07-14 DIAGNOSIS — G35 MULTIPLE SCLEROSIS: ICD-10-CM

## 2020-07-14 PROCEDURE — 99999 PR PBB SHADOW E&M-EST. PATIENT-LVL III: ICD-10-PCS | Mod: PBBFAC,,, | Performed by: PODIATRIST

## 2020-07-14 PROCEDURE — 99024 POSTOP FOLLOW-UP VISIT: CPT | Mod: S$GLB,,, | Performed by: PODIATRIST

## 2020-07-14 PROCEDURE — 99999 PR PBB SHADOW E&M-EST. PATIENT-LVL III: CPT | Mod: PBBFAC,,, | Performed by: PODIATRIST

## 2020-07-14 PROCEDURE — 99024 PR POST-OP FOLLOW-UP VISIT: ICD-10-PCS | Mod: S$GLB,,, | Performed by: PODIATRIST

## 2020-07-14 NOTE — PROGRESS NOTES
Subjective:       Patient ID: Mary Jo Chan is a 49 y.o. female.    Chief Complaint: Post-op Evaluation (s/p RLE met. fx ORIF )      HPI:  Mary Jo Chan presents to the office today, s/p 6/29/20 ORIF of right 3rd metatarsal fracture w/ Bone Marrow Aspiration, right tibia. No DVT PPx. WBAT with walking boot with double crutches.  Patient does continue oral vitamin-D supplementation. She does continue to smoke cigarettes. States some mild neuralgia.     No results found for: HGBA1C    Review of patient's allergies indicates:   Allergen Reactions    Prozac [fluoxetine] Other (See Comments)     Pt states she is intolerant of all antidepressants-makes her forget who or where she is    Adhesive tape-silicones Dermatitis     Skin sluffs off, burns skin    Powder scent fragrance Rash     Powder from gloves       Past Medical History:   Diagnosis Date    Anxiety     with severe panic attacks    Chronic fatigue     Depression     Disc degeneration     Disc degeneration     Diverticulosis     Herniated disc     IBS (irritable bowel syndrome)     IgG deficiency     Multiple sclerosis     Osteoarthritis     Other chronic sinusitis     Panic attack        Family History   Problem Relation Age of Onset    Colon cancer Mother 30       Social History     Socioeconomic History    Marital status:      Spouse name: Not on file    Number of children: Not on file    Years of education: Not on file    Highest education level: Not on file   Occupational History    Not on file   Social Needs    Financial resource strain: Not on file    Food insecurity     Worry: Not on file     Inability: Not on file    Transportation needs     Medical: Not on file     Non-medical: Not on file   Tobacco Use    Smoking status: Current Some Day Smoker     Packs/day: 0.50     Years: 27.00     Pack years: 13.50     Types: Cigarettes    Smokeless tobacco: Never Used    Tobacco comment: No smoking after m.n prior to sx    Substance and Sexual Activity    Alcohol use: No    Drug use: No    Sexual activity: Not on file   Lifestyle    Physical activity     Days per week: Not on file     Minutes per session: Not on file    Stress: Not on file   Relationships    Social connections     Talks on phone: Not on file     Gets together: Not on file     Attends Rastafari service: Not on file     Active member of club or organization: Not on file     Attends meetings of clubs or organizations: Not on file     Relationship status: Not on file   Other Topics Concern    Not on file   Social History Narrative           Past Surgical History:   Procedure Laterality Date    APPENDECTOMY      BLADDER SURGERY      multiple repairs after tears during ex lap    BONE MARROW ASPIRATION Right 2020    Procedure: ASPIRATION, BONE MARROW;  Surgeon: Richie Dunn DPM;  Location: [facility] OR;  Service: Podiatry;  Laterality: Right;  FROM TIBIA     SECTION, LOW TRANSVERSE      CHOLECYSTECTOMY      CYSTOTOMY      with repair at time of     FOOT NEUROMA SURGERY Right     HYSTERECTOMY      OPEN REDUCTION AND INTERNAL FIXATION (ORIF) OF FRACTURE OF METATARSAL BONE Right 2020    Procedure: ORIF, FRACTURE, METATARSAL BONE;  Surgeon: Richie Dunn DPM;  Location: [facility] OR;  Service: Podiatry;  Laterality: Right;    [place] EXPLORATORY OF ABDOMEN      Multiple with lysis of adhesions x's 13    SHOULDER SURGERY      right    TYMPANOSTOMY TUBE PLACEMENT Left        Review of Systems   Constitutional: Negative for chills, fatigue and fever.   HENT: Negative for hearing loss.    Eyes: Negative for photophobia and visual disturbance.   Respiratory: Negative for cough, chest tightness, shortness of breath and wheezing.    Cardiovascular: Negative for chest pain and palpitations.   Gastrointestinal: Negative for constipation, diarrhea, nausea and vomiting.   Endocrine: Negative for cold intolerance and heat intolerance.  "  Genitourinary: Negative for flank pain.   Musculoskeletal: Positive for gait problem. Negative for neck pain and neck stiffness.   Skin: Positive for wound.   Neurological: Negative for light-headedness and headaches.   Psychiatric/Behavioral: Negative for sleep disturbance.          Objective:   /84   Pulse 96   Resp 17   Ht 5' 6" (1.676 m)   Wt 82.5 kg (181 lb 14.1 oz)   BMI 29.36 kg/m²       Physical Exam  LOWER EXTREMITY PHYSICAL EXAMINATION    VASCULAR:  No ipsilateral calf pain or tenderness is noted with palpation and compression. No palpable cords noted.    DERMATOLOGY:  No evidence of wound healing complications post removal of sutures.    ORTHOPEDIC:  Mild discomfort to palpation of the areas of incision.    Assessment:     1. Postop check    2. Stress fracture of metatarsal bone of right foot, sequela    3. Vitamin D insufficiency    4. Pain in right foot    5. Multiple sclerosis    6. Tobacco abuse          Plan:     Postop check    Stress fracture of metatarsal bone of right foot, sequela    Vitamin D insufficiency    Pain in right foot    Multiple sclerosis    Tobacco abuse        Thorough discussion is had with the patient today, concerning the diagnosis, its etiology, and the treatment algorithm at present.  Sutures are removed without incident.  Patient may continue to ambulate with heel touch with double crutches with a CAM walker.  Continue oral vitamin-D supplementation.  Did discuss in detail the harmful effects of nicotine/tobacco/cigarette smoking, especially in relation to the lower extremity. I do rec. consultation with primary care provider for further discussed of smoking cessation methods. Smoking & Tobacco use cessation couseling was rendered at today's visit; intermediate, bewteen 3 and 10 minutes.  Follow up in approximately 2.5 weeks.  Patient may shower are as per normal.          Future Appointments   Date Time Provider Department Center   7/31/2020 11:30 AM Richie LINARES" ALEA Dunn [facility]

## 2020-07-30 DIAGNOSIS — Z09 POSTOP CHECK: Primary | ICD-10-CM

## 2020-07-31 ENCOUNTER — OFFICE VISIT (OUTPATIENT)
Dept: PODIATRY | Facility: CLINIC | Age: 50
End: 2020-07-31
Payer: COMMERCIAL

## 2020-07-31 ENCOUNTER — HOSPITAL ENCOUNTER (OUTPATIENT)
Dept: RADIOLOGY | Facility: HOSPITAL | Age: 50
Discharge: HOME | End: 2020-07-31
Attending: PODIATRIST
Payer: COMMERCIAL

## 2020-07-31 VITALS
BODY MASS INDEX: 29.05 KG/M2 | RESPIRATION RATE: 17 BRPM | SYSTOLIC BLOOD PRESSURE: 108 MMHG | HEART RATE: 88 BPM | HEIGHT: 66 IN | DIASTOLIC BLOOD PRESSURE: 69 MMHG | WEIGHT: 180.75 LBS

## 2020-07-31 DIAGNOSIS — Z72.0 TOBACCO ABUSE: ICD-10-CM

## 2020-07-31 DIAGNOSIS — G35 MULTIPLE SCLEROSIS: ICD-10-CM

## 2020-07-31 DIAGNOSIS — M79.671 PAIN IN RIGHT FOOT: ICD-10-CM

## 2020-07-31 DIAGNOSIS — Z09 POSTOP CHECK: Primary | ICD-10-CM

## 2020-07-31 DIAGNOSIS — M84.374S STRESS FRACTURE OF METATARSAL BONE OF RIGHT FOOT, SEQUELA: ICD-10-CM

## 2020-07-31 DIAGNOSIS — Z09 POSTOP CHECK: ICD-10-CM

## 2020-07-31 DIAGNOSIS — E55.9 VITAMIN D INSUFFICIENCY: ICD-10-CM

## 2020-07-31 PROCEDURE — 73630 XR FOOT COMPLETE 3 VIEW RIGHT: ICD-10-PCS | Mod: 26,RT,, | Performed by: RADIOLOGY

## 2020-07-31 PROCEDURE — 99999 PR PBB SHADOW E&M-EST. PATIENT-LVL III: CPT | Mod: PBBFAC,,, | Performed by: PODIATRIST

## 2020-07-31 PROCEDURE — 99024 PR POST-OP FOLLOW-UP VISIT: ICD-10-PCS | Mod: S$GLB,,, | Performed by: PODIATRIST

## 2020-07-31 PROCEDURE — 99024 POSTOP FOLLOW-UP VISIT: CPT | Mod: S$GLB,,, | Performed by: PODIATRIST

## 2020-07-31 PROCEDURE — 73630 X-RAY EXAM OF FOOT: CPT | Mod: TC,RT

## 2020-07-31 PROCEDURE — 73630 X-RAY EXAM OF FOOT: CPT | Mod: 26,RT,, | Performed by: RADIOLOGY

## 2020-07-31 PROCEDURE — 99999 PR PBB SHADOW E&M-EST. PATIENT-LVL III: ICD-10-PCS | Mod: PBBFAC,,, | Performed by: PODIATRIST

## 2020-07-31 NOTE — PROGRESS NOTES
Subjective:       Patient ID: Mary Jo Chan is a 49 y.o. female.    Chief Complaint: Post-op Evaluation (DOS 06/29/2020  RLE met. fx ORIF, reports no pain, wear walking boot, non daibetic, PCP Dr. Guzman  )      HPI:  Mary Jo Chan presents to the office today, s/p 6/29/20 ORIF of right 3rd metatarsal fracture w/ Bone Marrow Aspiration, right tibia. WBAT with walking boot purchase.  Patient does continue oral vitamin-D supplementation. She does continue to smoke cigarettes.  States minimal to no pains.     No results found for: HGBA1C    Review of patient's allergies indicates:   Allergen Reactions    Prozac [fluoxetine] Other (See Comments)     Pt states she is intolerant of all antidepressants-makes her forget who or where she is    Adhesive tape-silicones Dermatitis     Skin sluffs off, burns skin    Powder scent fragrance Rash     Powder from gloves       Past Medical History:   Diagnosis Date    Anxiety     with severe panic attacks    Chronic fatigue     Depression     Disc degeneration     Disc degeneration     Diverticulosis     Herniated disc     IBS (irritable bowel syndrome)     IgG deficiency     Multiple sclerosis     Osteoarthritis     Other chronic sinusitis     Panic attack        Family History   Problem Relation Age of Onset    Colon cancer Mother 30       Social History     Socioeconomic History    Marital status:      Spouse name: Not on file    Number of children: Not on file    Years of education: Not on file    Highest education level: Not on file   Occupational History    Not on file   Social Needs    Financial resource strain: Not on file    Food insecurity     Worry: Not on file     Inability: Not on file    Transportation needs     Medical: Not on file     Non-medical: Not on file   Tobacco Use    Smoking status: Current Some Day Smoker     Packs/day: 0.50     Years: 27.00     Pack years: 13.50     Types: Cigarettes    Smokeless tobacco: Never Used     Tobacco comment: No smoking after m.n prior to sx   Substance and Sexual Activity    Alcohol use: No    Drug use: No    Sexual activity: Not on file   Lifestyle    Physical activity     Days per week: Not on file     Minutes per session: Not on file    Stress: Not on file   Relationships    Social connections     Talks on phone: Not on file     Gets together: Not on file     Attends Latter day service: Not on file     Active member of club or organization: Not on file     Attends meetings of clubs or organizations: Not on file     Relationship status: Not on file   Other Topics Concern    Not on file   Social History Narrative           Past Surgical History:   Procedure Laterality Date    APPENDECTOMY      BLADDER SURGERY      multiple repairs after tears during ex lap    BONE MARROW ASPIRATION Right 2020    Procedure: ASPIRATION, BONE MARROW;  Surgeon: Richie Dunn DPM;  Location: [facility] OR;  Service: Podiatry;  Laterality: Right;  FROM TIBIA     SECTION, LOW TRANSVERSE      CHOLECYSTECTOMY      CYSTOTOMY      with repair at time of     FOOT NEUROMA SURGERY Right     HYSTERECTOMY      OPEN REDUCTION AND INTERNAL FIXATION (ORIF) OF FRACTURE OF METATARSAL BONE Right 2020    Procedure: ORIF, FRACTURE, METATARSAL BONE;  Surgeon: Richie Dunn DPM;  Location: [facility] OR;  Service: Podiatry;  Laterality: Right;    [place] EXPLORATORY OF ABDOMEN      Multiple with lysis of adhesions x's 13    SHOULDER SURGERY      right    TYMPANOSTOMY TUBE PLACEMENT Left        Review of Systems   Constitutional: Negative for chills, fatigue and fever.   HENT: Negative for hearing loss.    Eyes: Negative for photophobia and visual disturbance.   Respiratory: Negative for cough, chest tightness, shortness of breath and wheezing.    Cardiovascular: Negative for chest pain and palpitations.   Gastrointestinal: Negative for constipation, diarrhea, nausea and vomiting.   Endocrine:  "Negative for cold intolerance and heat intolerance.   Genitourinary: Negative for flank pain.   Musculoskeletal: Positive for gait problem. Negative for neck pain and neck stiffness.   Skin: Positive for wound.   Neurological: Negative for light-headedness and headaches.   Psychiatric/Behavioral: Negative for sleep disturbance.          Objective:   /69   Pulse 88   Resp 17   Ht 5' 6" (1.676 m)   Wt 82 kg (180 lb 12.4 oz)   BMI 29.18 kg/m²       Physical Exam  LOWER EXTREMITY PHYSICAL EXAMINATION    ORTHOPEDIC:  No edema.  Slight discomfort to palpation.  Anatomic alignment is noted.  Range of motion, lesser metatarsophalangeal joints.     Assessment:     1. Postop check    2. Stress fracture of metatarsal bone of right foot, sequela    3. Vitamin D insufficiency    4. Pain in right foot    5. Tobacco abuse    6. Multiple sclerosis          Plan:     Postop check  Stress fracture of metatarsal bone of right foot, sequela  Pain in right foot  -     X-Ray Foot Complete Right; Future; Expected date: 08/14/2020    Thorough discussion is had with the patient today, concerning the diagnosis, its etiology, and the treatment algorithm at present. XRAYS are reviewed in detail with the patient. All questions and concerns regarding findings and its/their implications are outlined and discussed.  Continue walking boot for duration of 2.5-3.0 weeks.  Continue oral vitamin-D supplementation.  RICE therapy as needed.  Patient may shower base.  She may swim as well, low/no contact.    Vitamin D insufficiency  Continue oral vitamin-D supplementation.    Tobacco abuse  Did discuss in detail the harmful effects of nicotine/tobacco/cigarette smoking, especially in relation to the lower extremity. I do rec. consultation with primary care provider for further discussed of smoking cessation methods. Smoking & Tobacco use cessation couseling was rendered at today's visit; intermediate, bewteen 3 and 10 minutes.    Multiple " sclerosis  Patient advised to follow up with Primary Care Physician for management of comorbid states.          Future Appointments   Date Time Provider Department Center   8/19/2020 11:30 AM Richie Dunn DPM [facility]

## 2020-08-19 ENCOUNTER — HOSPITAL ENCOUNTER (OUTPATIENT)
Dept: RADIOLOGY | Facility: HOSPITAL | Age: 50
Discharge: HOME | End: 2020-08-19
Attending: PODIATRIST
Payer: COMMERCIAL

## 2020-08-19 ENCOUNTER — OFFICE VISIT (OUTPATIENT)
Dept: PODIATRY | Facility: CLINIC | Age: 50
End: 2020-08-19
Payer: COMMERCIAL

## 2020-08-19 VITALS
DIASTOLIC BLOOD PRESSURE: 65 MMHG | HEART RATE: 85 BPM | BODY MASS INDEX: 29.05 KG/M2 | SYSTOLIC BLOOD PRESSURE: 97 MMHG | WEIGHT: 180.75 LBS | HEIGHT: 66 IN

## 2020-08-19 DIAGNOSIS — M79.671 PAIN IN RIGHT FOOT: ICD-10-CM

## 2020-08-19 DIAGNOSIS — M84.374S STRESS FRACTURE OF METATARSAL BONE OF RIGHT FOOT, SEQUELA: ICD-10-CM

## 2020-08-19 DIAGNOSIS — Z09 POSTOP CHECK: Primary | ICD-10-CM

## 2020-08-19 DIAGNOSIS — Z09 POSTOP CHECK: ICD-10-CM

## 2020-08-19 DIAGNOSIS — Z72.0 TOBACCO ABUSE: ICD-10-CM

## 2020-08-19 DIAGNOSIS — E55.9 VITAMIN D INSUFFICIENCY: ICD-10-CM

## 2020-08-19 DIAGNOSIS — G35 MULTIPLE SCLEROSIS: ICD-10-CM

## 2020-08-19 PROCEDURE — 99999 PR PBB SHADOW E&M-EST. PATIENT-LVL III: ICD-10-PCS | Mod: PBBFAC,,, | Performed by: PODIATRIST

## 2020-08-19 PROCEDURE — 99024 PR POST-OP FOLLOW-UP VISIT: ICD-10-PCS | Mod: S$GLB,,, | Performed by: PODIATRIST

## 2020-08-19 PROCEDURE — 99024 POSTOP FOLLOW-UP VISIT: CPT | Mod: S$GLB,,, | Performed by: PODIATRIST

## 2020-08-19 PROCEDURE — 73630 X-RAY EXAM OF FOOT: CPT | Mod: TC,RT

## 2020-08-19 PROCEDURE — 73630 X-RAY EXAM OF FOOT: CPT | Mod: 26,RT,, | Performed by: RADIOLOGY

## 2020-08-19 PROCEDURE — 73630 XR FOOT COMPLETE 3 VIEW RIGHT: ICD-10-PCS | Mod: 26,RT,, | Performed by: RADIOLOGY

## 2020-08-19 PROCEDURE — 99999 PR PBB SHADOW E&M-EST. PATIENT-LVL III: CPT | Mod: PBBFAC,,, | Performed by: PODIATRIST

## 2020-08-19 NOTE — PROGRESS NOTES
Subjective:       Patient ID: Mary Jo Chan is a 49 y.o. female.    Chief Complaint: Post-op Evaluation (DOS06/29/2020 ORIF Fracture Metatarsal Bone R. Foot rates pain 2/10 walking non diabetic pt pcp Dr. Guzman.)      HPI:  Mary Jo Chan presents to the office today, s/p 6/29/20 ORIF of right 3rd metatarsal fracture w/ Bone Marrow Aspiration, right tibia. WBAT with walking boot purchase.  Patient does continue oral vitamin-D supplementation. She does continue to smoke cigarettes.  States minimal to no pains.  Ambulatory with a walking boot.  Still complains of an inability to flex and extend the 3rd toe.    No results found for: HGBA1C    Review of patient's allergies indicates:   Allergen Reactions    Prozac [fluoxetine] Other (See Comments)     Pt states she is intolerant of all antidepressants-makes her forget who or where she is    Adhesive tape-silicones Dermatitis     Skin sluffs off, burns skin    Powder scent fragrance Rash     Powder from gloves       Past Medical History:   Diagnosis Date    Anxiety     with severe panic attacks    Chronic fatigue     Depression     Disc degeneration     Disc degeneration     Diverticulosis     Herniated disc     IBS (irritable bowel syndrome)     IgG deficiency     Multiple sclerosis     Osteoarthritis     Other chronic sinusitis     Panic attack        Family History   Problem Relation Age of Onset    Colon cancer Mother 30       Social History     Socioeconomic History    Marital status:      Spouse name: Not on file    Number of children: Not on file    Years of education: Not on file    Highest education level: Not on file   Occupational History    Not on file   Social Needs    Financial resource strain: Not on file    Food insecurity     Worry: Not on file     Inability: Not on file    Transportation needs     Medical: Not on file     Non-medical: Not on file   Tobacco Use    Smoking status: Current Some Day Smoker      Packs/day: 0.50     Years: 27.00     Pack years: 13.50     Types: Cigarettes    Smokeless tobacco: Never Used    Tobacco comment: No smoking after m.n prior to sx   Substance and Sexual Activity    Alcohol use: No    Drug use: No    Sexual activity: Not on file   Lifestyle    Physical activity     Days per week: Not on file     Minutes per session: Not on file    Stress: Not on file   Relationships    Social connections     Talks on phone: Not on file     Gets together: Not on file     Attends Scientologist service: Not on file     Active member of club or organization: Not on file     Attends meetings of clubs or organizations: Not on file     Relationship status: Not on file   Other Topics Concern    Not on file   Social History Narrative           Past Surgical History:   Procedure Laterality Date    APPENDECTOMY      BLADDER SURGERY      multiple repairs after tears during ex lap    BONE MARROW ASPIRATION Right 2020    Procedure: ASPIRATION, BONE MARROW;  Surgeon: Richie Dunn DPM;  Location: [facility] OR;  Service: Podiatry;  Laterality: Right;  FROM TIBIA     SECTION, LOW TRANSVERSE      CHOLECYSTECTOMY      CYSTOTOMY      with repair at time of     FOOT NEUROMA SURGERY Right     HYSTERECTOMY      OPEN REDUCTION AND INTERNAL FIXATION (ORIF) OF FRACTURE OF METATARSAL BONE Right 2020    Procedure: ORIF, FRACTURE, METATARSAL BONE;  Surgeon: Richie Dunn DPM;  Location: [facility] OR;  Service: Podiatry;  Laterality: Right;    [place] EXPLORATORY OF ABDOMEN      Multiple with lysis of adhesions x's 13    SHOULDER SURGERY      right    TYMPANOSTOMY TUBE PLACEMENT Left        Review of Systems   Constitutional: Negative for chills, fatigue and fever.   HENT: Negative for hearing loss.    Eyes: Negative for photophobia and visual disturbance.   Respiratory: Negative for cough, chest tightness, shortness of breath and wheezing.    Cardiovascular: Negative for chest pain and  "palpitations.   Gastrointestinal: Negative for constipation, diarrhea, nausea and vomiting.   Endocrine: Negative for cold intolerance and heat intolerance.   Genitourinary: Negative for flank pain.   Musculoskeletal: Positive for gait problem. Negative for neck pain and neck stiffness.   Skin: Positive for wound.   Neurological: Negative for light-headedness and headaches.   Psychiatric/Behavioral: Negative for sleep disturbance.          Objective:   BP 97/65   Pulse 85   Ht 5' 6" (1.676 m)   Wt 82 kg (180 lb 12.4 oz)   BMI 29.18 kg/m²       Physical Exam  LOWER EXTREMITY PHYSICAL EXAMINATION    ORTHOPEDIC:  No edema. Minimal discomfort to palpation at the 3rd ray.  Anatomic alignment is noted.  Range of motion is decreased at the 3rd metatarsophalangeal joint.  Plate fixation is palpable below the skin.    Assessment:     1. Postop check    2. Stress fracture of metatarsal bone of right foot, sequela    3. Pain in right foot    4. Vitamin D insufficiency    5. Tobacco abuse    6. Multiple sclerosis          Plan:     Postop check  Stress fracture of metatarsal bone of right foot, sequela  Pain in right foot  Thorough discussion is had with the patient today, concerning the diagnosis, its etiology, and the treatment algorithm at present. XRAYS are reviewed in detail with the patient. All questions and concerns regarding findings and its/their implications are outlined and discussed.  Patient may discontinue the walking boot and transition to normal shoe gear.  Continue oral vitamin-D supplementation.  RICE therapy as needed.  Patient is discharged at present, and is advised to follow up as needed or in the event of symptomology.  Patient may follow up in approximately 1 month for plate removal due to decreased range of motion at the metatarsophalangeal joint.    Vitamin D insufficiency  Continue oral vitamin-D supplementation.    Tobacco abuse  Did discuss in detail the harmful effects of " nicotine/tobacco/cigarette smoking, especially in relation to the lower extremity. I do rec. consultation with primary care provider for further discussed of smoking cessation methods. Smoking & Tobacco use cessation couseling was rendered at today's visit; intermediate, bewteen 3 and 10 minutes.    Multiple sclerosis  Patient advised to follow up with Primary Care Physician for management of comorbid states.          No future appointments.

## 2020-09-10 ENCOUNTER — TELEPHONE (OUTPATIENT)
Dept: PODIATRY | Facility: CLINIC | Age: 50
End: 2020-09-10

## 2020-09-10 NOTE — TELEPHONE ENCOUNTER
Spoke with pt she wanted to inform the Dr. That she is thinking about going back and have surgery to remove the Hardware. I informed the pt that I will give the message to the Dr she also stated tat she is gong on vacation in December. And the exercise the Dr recommenced is working but she is still having pain. She will give the Dr a call back in November to see if she stills wants to have  Surgery.          Ce Delgado MA  Podiatry Surgical Department  [facility]                ----- Message from Merced Shaw sent at 9/10/2020 10:09 AM CDT -----  Regarding: Schedule Sx Appt  Appointment Access Request:    Pt called to speak to the nurse to request an appt for sx due to needing the plate removed because of foot pain.    Pt would like a call back today and can be reached at [phone]

## 2020-10-27 ENCOUNTER — OFFICE VISIT (OUTPATIENT)
Dept: PODIATRY | Facility: CLINIC | Age: 50
End: 2020-10-27
Payer: COMMERCIAL

## 2020-10-27 ENCOUNTER — HOSPITAL ENCOUNTER (OUTPATIENT)
Dept: RADIOLOGY | Facility: HOSPITAL | Age: 50
Discharge: HOME | End: 2020-10-27
Attending: PODIATRIST
Payer: COMMERCIAL

## 2020-10-27 DIAGNOSIS — M84.374S STRESS FRACTURE OF METATARSAL BONE OF RIGHT FOOT, SEQUELA: ICD-10-CM

## 2020-10-27 DIAGNOSIS — M79.671 PAIN IN RIGHT FOOT: Primary | ICD-10-CM

## 2020-10-27 PROCEDURE — 73630 X-RAY EXAM OF FOOT: CPT | Mod: TC,RT

## 2020-10-27 PROCEDURE — 99999 PR PBB SHADOW E&M-EST. PATIENT-LVL II: CPT | Mod: PBBFAC,,, | Performed by: PODIATRIST

## 2020-10-27 PROCEDURE — 99214 PR OFFICE/OUTPT VISIT, EST, LEVL IV, 30-39 MIN: ICD-10-PCS | Mod: S$GLB,,, | Performed by: PODIATRIST

## 2020-10-27 PROCEDURE — 73630 XR FOOT COMPLETE 3 VIEW RIGHT: ICD-10-PCS | Mod: 26,RT,, | Performed by: RADIOLOGY

## 2020-10-27 PROCEDURE — 99214 OFFICE O/P EST MOD 30 MIN: CPT | Mod: S$GLB,,, | Performed by: PODIATRIST

## 2020-10-27 PROCEDURE — 73630 X-RAY EXAM OF FOOT: CPT | Mod: 26,RT,, | Performed by: RADIOLOGY

## 2020-10-27 PROCEDURE — 99999 PR PBB SHADOW E&M-EST. PATIENT-LVL II: ICD-10-PCS | Mod: PBBFAC,,, | Performed by: PODIATRIST

## 2020-10-27 NOTE — PROGRESS NOTES
Subjective:       Patient ID: Mary Jo Chan is a 49 y.o. female.    Chief Complaint: Foot Injury (Patient states she dropped an i pad on her right foot. discoloration noted to right dorsal foot. Patient states her  stuck a needle in a tiny hole in her foot to see if he found anything. )      HPI:  Mary Jo Chan presents to the office today, s/p 6/29/20 ORIF of right 3rd metatarsal fracture w/ Bone Marrow Aspiration, right tibia. WBAT with sneakers.  Patient presents emergently this afternoon stating that she dropped iPad on the incision site.  States severe pains as resolved.  Does state a minor wound in the area prior states.  Prior to drop the eye pad the area, pains were 2/10.  States slightly antalgic gait pattern.    No results found for: HGBA1C    Review of patient's allergies indicates:   Allergen Reactions    Prozac [fluoxetine] Other (See Comments)     Pt states she is intolerant of all antidepressants-makes her forget who or where she is    Adhesive tape-silicones Dermatitis     Skin sluffs off, burns skin    Powder scent fragrance Rash     Powder from gloves       Past Medical History:   Diagnosis Date    Anxiety     with severe panic attacks    Chronic fatigue     Depression     Disc degeneration     Disc degeneration     Diverticulosis     Herniated disc     IBS (irritable bowel syndrome)     IgG deficiency     Multiple sclerosis     Osteoarthritis     Other chronic sinusitis     Panic attack        Family History   Problem Relation Age of Onset    Colon cancer Mother 30       Social History     Socioeconomic History    Marital status:      Spouse name: Not on file    Number of children: Not on file    Years of education: Not on file    Highest education level: Not on file   Occupational History    Not on file   Social Needs    Financial resource strain: Not on file    Food insecurity     Worry: Not on file     Inability: Not on file    Transportation needs      Medical: Not on file     Non-medical: Not on file   Tobacco Use    Smoking status: Current Some Day Smoker     Packs/day: 0.50     Years: 27.00     Pack years: 13.50     Types: Cigarettes    Smokeless tobacco: Never Used    Tobacco comment: No smoking after m.n prior to sx   Substance and Sexual Activity    Alcohol use: No    Drug use: No    Sexual activity: Not on file   Lifestyle    Physical activity     Days per week: Not on file     Minutes per session: Not on file    Stress: Not on file   Relationships    Social connections     Talks on phone: Not on file     Gets together: Not on file     Attends Congregation service: Not on file     Active member of club or organization: Not on file     Attends meetings of clubs or organizations: Not on file     Relationship status: Not on file   Other Topics Concern    Not on file   Social History Narrative           Past Surgical History:   Procedure Laterality Date    APPENDECTOMY      BLADDER SURGERY      multiple repairs after tears during ex lap    BONE MARROW ASPIRATION Right 2020    Procedure: ASPIRATION, BONE MARROW;  Surgeon: Richie Dunn DPM;  Location: [facility] OR;  Service: Podiatry;  Laterality: Right;  FROM TIBIA     SECTION, LOW TRANSVERSE      CHOLECYSTECTOMY      CYSTOTOMY      with repair at time of     FOOT NEUROMA SURGERY Right     HYSTERECTOMY      OPEN REDUCTION AND INTERNAL FIXATION (ORIF) OF FRACTURE OF METATARSAL BONE Right 2020    Procedure: ORIF, FRACTURE, METATARSAL BONE;  Surgeon: Richie Dunn DPM;  Location: [facility] OR;  Service: Podiatry;  Laterality: Right;    NH EXPLORATORY OF ABDOMEN      Multiple with lysis of adhesions x's 13    SHOULDER SURGERY      right    TYMPANOSTOMY TUBE PLACEMENT Left        Review of Systems   Constitutional: Negative for chills, fatigue and fever.   HENT: Negative for hearing loss.    Eyes: Negative for photophobia and visual disturbance.   Respiratory:  Negative for cough, chest tightness, shortness of breath and wheezing.    Cardiovascular: Negative for chest pain and palpitations.   Gastrointestinal: Negative for constipation, diarrhea, nausea and vomiting.   Endocrine: Negative for cold intolerance and heat intolerance.   Genitourinary: Negative for flank pain.   Musculoskeletal: Positive for gait problem. Negative for neck pain and neck stiffness.   Skin: Positive for wound.   Neurological: Negative for light-headedness and headaches.   Psychiatric/Behavioral: Negative for sleep disturbance.          Objective:   There were no vitals taken for this visit.      Physical Exam  LOWER EXTREMITY PHYSICAL EXAMINATION  NEUROLOGY: Sensation to light touch is intact. Proprioception is intact. Sensation to pin prick is intact.    VASCULAR: On the right foot, the dorsalis pedis pulse is 2/4 and the posterior tibial pulse is 2/4. Capillary refill time is less than 3 seconds. Hair growth is present on the dorsum of the foot and at the digits. No rubor is present. Proximal to distal temperature is warm to warm.     ORTHOPEDIC:  No edema. Minimal discomfort to palpation at the 3rd ray incision.  Anatomic alignment is noted.  No edema.    DERMATOLOGY:  There is a area which measures 1 mm x 1 mm x 2 mm wear and obvious Vicryl suture or skin suture, did not allow complete coaptation.  No local signs of infection.     Assessment:     1. Pain in right foot    2. Stress fracture of metatarsal bone of right foot, sequela          Plan:     Pain in right foot    Stress fracture of metatarsal bone of right foot, sequela  -     X-Ray Foot Complete Right; Future; Expected date: 10/27/2020       Thorough discussion is had with the patient today, concerning the diagnosis, its etiology, and the treatment algorithm at present. XRAYS are reviewed in detail with the patient. All questions and concerns regarding findings and its/their implications are outlined and discussed.  No local signs of  deep pathology at the prior open external fixation site.  The wound was surgically debrided after adequate prep with alcohol and/or betadine paint. Excisional wound debridement was performed using sharp #10/#15 blade/rounded scalpel and tissue nipper, with removal of all non-viable skin and soft tissues; necrotic skin/tissue formation. The woundbase/wound bed was also debrided to encourage bleeding as to promote/stimulate healing. Debridement was excisional and included epidermal, dermal and subcutaneous tissues. Post debridement measurements are as above. Hemostasis was achieved. Patient tolerated procedure well and without complications. Local woundcare with antibiotic ointment dressings and bandage thereafter.  Patient may continue daily dressing change with Betadine paint dressing.  Continue normal shoe gear.                No future appointments.

## 2021-01-05 ENCOUNTER — HOSPITAL ENCOUNTER (OUTPATIENT)
Dept: RADIOLOGY | Facility: HOSPITAL | Age: 51
Discharge: HOME | End: 2021-01-05
Attending: PODIATRIST
Payer: COMMERCIAL

## 2021-01-05 ENCOUNTER — OFFICE VISIT (OUTPATIENT)
Dept: PODIATRY | Facility: CLINIC | Age: 51
End: 2021-01-05
Payer: COMMERCIAL

## 2021-01-05 VITALS
SYSTOLIC BLOOD PRESSURE: 123 MMHG | DIASTOLIC BLOOD PRESSURE: 78 MMHG | BODY MASS INDEX: 27.76 KG/M2 | HEART RATE: 85 BPM | HEIGHT: 66 IN | WEIGHT: 172.75 LBS

## 2021-01-05 DIAGNOSIS — L02.611 ABSCESS OF RIGHT FOOT: ICD-10-CM

## 2021-01-05 DIAGNOSIS — B37.9 ANTIBIOTIC-INDUCED YEAST INFECTION: ICD-10-CM

## 2021-01-05 DIAGNOSIS — L97.512 ULCER OF RIGHT FOOT, WITH FAT LAYER EXPOSED: ICD-10-CM

## 2021-01-05 DIAGNOSIS — L03.115 CELLULITIS OF RIGHT FOOT: ICD-10-CM

## 2021-01-05 DIAGNOSIS — Z72.0 TOBACCO ABUSE: ICD-10-CM

## 2021-01-05 DIAGNOSIS — L02.611 ABSCESS OF RIGHT FOOT: Primary | ICD-10-CM

## 2021-01-05 DIAGNOSIS — G35 MULTIPLE SCLEROSIS: ICD-10-CM

## 2021-01-05 DIAGNOSIS — Z98.890 HISTORY OF FOOT SURGERY: ICD-10-CM

## 2021-01-05 DIAGNOSIS — T36.95XA ANTIBIOTIC-INDUCED YEAST INFECTION: ICD-10-CM

## 2021-01-05 PROCEDURE — 99214 OFFICE O/P EST MOD 30 MIN: CPT | Mod: 25,S$GLB,, | Performed by: PODIATRIST

## 2021-01-05 PROCEDURE — 25500020 PHARM REV CODE 255: Performed by: PODIATRIST

## 2021-01-05 PROCEDURE — 87186 SC STD MICRODIL/AGAR DIL: CPT

## 2021-01-05 PROCEDURE — 87077 CULTURE AEROBIC IDENTIFY: CPT

## 2021-01-05 PROCEDURE — 73630 X-RAY EXAM OF FOOT: CPT | Mod: TC,RT

## 2021-01-05 PROCEDURE — 99999 PR PBB SHADOW E&M-EST. PATIENT-LVL III: CPT | Mod: PBBFAC,,, | Performed by: PODIATRIST

## 2021-01-05 PROCEDURE — 99214 PR OFFICE/OUTPT VISIT, EST, LEVL IV, 30-39 MIN: ICD-10-PCS | Mod: 25,S$GLB,, | Performed by: PODIATRIST

## 2021-01-05 PROCEDURE — 73702 CT LWR EXTREMITY W/O&W/DYE: CPT | Mod: TC,RT

## 2021-01-05 PROCEDURE — 99999 PR PBB SHADOW E&M-EST. PATIENT-LVL III: ICD-10-PCS | Mod: PBBFAC,,, | Performed by: PODIATRIST

## 2021-01-05 PROCEDURE — 87070 CULTURE OTHR SPECIMN AEROBIC: CPT

## 2021-01-05 PROCEDURE — 73630 XR FOOT COMPLETE 3 VIEW RIGHT: ICD-10-PCS | Mod: 26,RT,, | Performed by: RADIOLOGY

## 2021-01-05 PROCEDURE — 11043 PR DEBRIDEMENT, SKIN, SUB-Q TISSUE,MUSCLE,=<20 SQ CM: ICD-10-PCS | Mod: S$GLB,,, | Performed by: PODIATRIST

## 2021-01-05 PROCEDURE — 11043 DBRDMT MUSC&/FSCA 1ST 20/<: CPT | Mod: S$GLB,,, | Performed by: PODIATRIST

## 2021-01-05 PROCEDURE — 73630 X-RAY EXAM OF FOOT: CPT | Mod: 26,RT,, | Performed by: RADIOLOGY

## 2021-01-05 RX ORDER — FLUCONAZOLE 150 MG/1
150 TABLET ORAL
Qty: 2 TABLET | Refills: 0 | Status: SHIPPED | OUTPATIENT
Start: 2021-01-05 | End: 2021-01-11

## 2021-01-05 RX ORDER — OXYCODONE AND ACETAMINOPHEN 10; 325 MG/1; MG/1
1 TABLET ORAL EVERY 6 HOURS PRN
Qty: 28 TABLET | Refills: 0 | Status: SHIPPED | OUTPATIENT
Start: 2021-01-05 | End: 2021-01-12

## 2021-01-05 RX ORDER — CIPROFLOXACIN 500 MG/1
500 TABLET ORAL EVERY 12 HOURS
Qty: 10 TABLET | Refills: 0 | Status: SHIPPED | OUTPATIENT
Start: 2021-01-05 | End: 2021-01-10

## 2021-01-05 RX ORDER — DOXYCYCLINE 100 MG/1
100 CAPSULE ORAL EVERY 12 HOURS
Qty: 14 CAPSULE | Refills: 0 | Status: SHIPPED | OUTPATIENT
Start: 2021-01-05 | End: 2021-01-12

## 2021-01-05 RX ADMIN — IOHEXOL 75 ML: 350 INJECTION, SOLUTION INTRAVENOUS at 03:01

## 2021-01-07 ENCOUNTER — TELEPHONE (OUTPATIENT)
Dept: PODIATRY | Facility: CLINIC | Age: 51
End: 2021-01-07

## 2021-01-08 ENCOUNTER — OFFICE VISIT (OUTPATIENT)
Dept: PODIATRY | Facility: CLINIC | Age: 51
End: 2021-01-08
Payer: COMMERCIAL

## 2021-01-08 ENCOUNTER — PATIENT MESSAGE (OUTPATIENT)
Dept: PODIATRY | Facility: CLINIC | Age: 51
End: 2021-01-08

## 2021-01-08 VITALS
HEART RATE: 74 BPM | WEIGHT: 165.44 LBS | DIASTOLIC BLOOD PRESSURE: 72 MMHG | BODY MASS INDEX: 26.59 KG/M2 | HEIGHT: 66 IN | SYSTOLIC BLOOD PRESSURE: 113 MMHG

## 2021-01-08 DIAGNOSIS — Z98.890 HISTORY OF FOOT SURGERY: ICD-10-CM

## 2021-01-08 DIAGNOSIS — Z72.0 TOBACCO ABUSE: ICD-10-CM

## 2021-01-08 DIAGNOSIS — L03.115 CELLULITIS OF RIGHT FOOT: ICD-10-CM

## 2021-01-08 DIAGNOSIS — L02.611 ABSCESS OF RIGHT FOOT: ICD-10-CM

## 2021-01-08 DIAGNOSIS — L97.512 ULCER OF RIGHT FOOT, WITH FAT LAYER EXPOSED: Primary | ICD-10-CM

## 2021-01-08 DIAGNOSIS — G35 MULTIPLE SCLEROSIS: ICD-10-CM

## 2021-01-08 LAB — BACTERIA SPEC AEROBE CULT: ABNORMAL

## 2021-01-08 PROCEDURE — 99999 PR PBB SHADOW E&M-EST. PATIENT-LVL III: CPT | Mod: PBBFAC,,, | Performed by: PODIATRIST

## 2021-01-08 PROCEDURE — 99999 PR PBB SHADOW E&M-EST. PATIENT-LVL III: ICD-10-PCS | Mod: PBBFAC,,, | Performed by: PODIATRIST

## 2021-01-08 PROCEDURE — 99499 NO LOS: ICD-10-PCS | Mod: S$GLB,,, | Performed by: PODIATRIST

## 2021-01-08 PROCEDURE — 11042 PR DEBRIDEMENT, SKIN, SUB-Q TISSUE,=<20 SQ CM: ICD-10-PCS | Mod: S$GLB,,, | Performed by: PODIATRIST

## 2021-01-08 PROCEDURE — 11042 DBRDMT SUBQ TIS 1ST 20SQCM/<: CPT | Mod: S$GLB,,, | Performed by: PODIATRIST

## 2021-01-08 PROCEDURE — 99499 UNLISTED E&M SERVICE: CPT | Mod: S$GLB,,, | Performed by: PODIATRIST

## 2021-01-11 ENCOUNTER — TELEPHONE (OUTPATIENT)
Dept: PODIATRY | Facility: CLINIC | Age: 51
End: 2021-01-11

## 2021-01-15 ENCOUNTER — OFFICE VISIT (OUTPATIENT)
Dept: PODIATRY | Facility: CLINIC | Age: 51
End: 2021-01-15
Payer: COMMERCIAL

## 2021-01-15 VITALS
SYSTOLIC BLOOD PRESSURE: 128 MMHG | HEIGHT: 66 IN | HEART RATE: 66 BPM | WEIGHT: 165.38 LBS | BODY MASS INDEX: 26.58 KG/M2 | DIASTOLIC BLOOD PRESSURE: 81 MMHG

## 2021-01-15 DIAGNOSIS — M79.671 PAIN IN RIGHT FOOT: ICD-10-CM

## 2021-01-15 DIAGNOSIS — Z98.890 HISTORY OF FOOT SURGERY: ICD-10-CM

## 2021-01-15 DIAGNOSIS — Z72.0 TOBACCO ABUSE: ICD-10-CM

## 2021-01-15 DIAGNOSIS — L03.115 CELLULITIS OF RIGHT FOOT: ICD-10-CM

## 2021-01-15 DIAGNOSIS — G35 MULTIPLE SCLEROSIS: ICD-10-CM

## 2021-01-15 DIAGNOSIS — L97.512 ULCER OF RIGHT FOOT, WITH FAT LAYER EXPOSED: Primary | ICD-10-CM

## 2021-01-15 DIAGNOSIS — T36.95XA ANTIBIOTIC-INDUCED YEAST INFECTION: ICD-10-CM

## 2021-01-15 DIAGNOSIS — B37.9 ANTIBIOTIC-INDUCED YEAST INFECTION: ICD-10-CM

## 2021-01-15 PROCEDURE — 11042 PR DEBRIDEMENT, SKIN, SUB-Q TISSUE,=<20 SQ CM: ICD-10-PCS | Mod: S$GLB,,, | Performed by: PODIATRIST

## 2021-01-15 PROCEDURE — 99999 PR PBB SHADOW E&M-EST. PATIENT-LVL III: CPT | Mod: PBBFAC,,, | Performed by: PODIATRIST

## 2021-01-15 PROCEDURE — 99214 PR OFFICE/OUTPT VISIT, EST, LEVL IV, 30-39 MIN: ICD-10-PCS | Mod: 25,S$GLB,, | Performed by: PODIATRIST

## 2021-01-15 PROCEDURE — 11042 DBRDMT SUBQ TIS 1ST 20SQCM/<: CPT | Mod: S$GLB,,, | Performed by: PODIATRIST

## 2021-01-15 PROCEDURE — 99214 OFFICE O/P EST MOD 30 MIN: CPT | Mod: 25,S$GLB,, | Performed by: PODIATRIST

## 2021-01-15 PROCEDURE — 99999 PR PBB SHADOW E&M-EST. PATIENT-LVL III: ICD-10-PCS | Mod: PBBFAC,,, | Performed by: PODIATRIST

## 2021-01-15 RX ORDER — DOXYCYCLINE 100 MG/1
100 CAPSULE ORAL EVERY 12 HOURS
Qty: 14 CAPSULE | Refills: 0 | Status: SHIPPED | OUTPATIENT
Start: 2021-01-15 | End: 2021-01-22

## 2021-01-15 RX ORDER — FLUCONAZOLE 150 MG/1
150 TABLET ORAL
Qty: 2 TABLET | Refills: 0 | Status: SHIPPED | OUTPATIENT
Start: 2021-01-15 | End: 2021-01-19

## 2021-01-29 ENCOUNTER — PATIENT MESSAGE (OUTPATIENT)
Dept: PODIATRY | Facility: CLINIC | Age: 51
End: 2021-01-29

## 2021-01-29 ENCOUNTER — OFFICE VISIT (OUTPATIENT)
Dept: PODIATRY | Facility: CLINIC | Age: 51
End: 2021-01-29
Payer: COMMERCIAL

## 2021-01-29 VITALS — HEIGHT: 66 IN | WEIGHT: 165.38 LBS | BODY MASS INDEX: 26.58 KG/M2

## 2021-01-29 DIAGNOSIS — Z98.890 HISTORY OF FOOT SURGERY: ICD-10-CM

## 2021-01-29 DIAGNOSIS — L97.512 ULCER OF RIGHT FOOT, WITH FAT LAYER EXPOSED: Primary | ICD-10-CM

## 2021-01-29 DIAGNOSIS — G35 MULTIPLE SCLEROSIS: ICD-10-CM

## 2021-01-29 DIAGNOSIS — L02.611 ABSCESS OF RIGHT FOOT: ICD-10-CM

## 2021-01-29 DIAGNOSIS — Z72.0 TOBACCO ABUSE: ICD-10-CM

## 2021-01-29 DIAGNOSIS — M86.171 ACUTE OSTEOMYELITIS OF METATARSAL BONE OF RIGHT FOOT: ICD-10-CM

## 2021-01-29 DIAGNOSIS — M79.671 PAIN IN RIGHT FOOT: ICD-10-CM

## 2021-01-29 PROCEDURE — 11042 PR DEBRIDEMENT, SKIN, SUB-Q TISSUE,=<20 SQ CM: ICD-10-PCS | Mod: S$GLB,,, | Performed by: PODIATRIST

## 2021-01-29 PROCEDURE — 99214 PR OFFICE/OUTPT VISIT, EST, LEVL IV, 30-39 MIN: ICD-10-PCS | Mod: 25,S$GLB,, | Performed by: PODIATRIST

## 2021-01-29 PROCEDURE — 99999 PR PBB SHADOW E&M-EST. PATIENT-LVL IV: ICD-10-PCS | Mod: PBBFAC,,, | Performed by: PODIATRIST

## 2021-01-29 PROCEDURE — 87070 CULTURE OTHR SPECIMN AEROBIC: CPT

## 2021-01-29 PROCEDURE — 99214 OFFICE O/P EST MOD 30 MIN: CPT | Mod: 25,S$GLB,, | Performed by: PODIATRIST

## 2021-01-29 PROCEDURE — 11042 DBRDMT SUBQ TIS 1ST 20SQCM/<: CPT | Mod: S$GLB,,, | Performed by: PODIATRIST

## 2021-01-29 PROCEDURE — 87077 CULTURE AEROBIC IDENTIFY: CPT

## 2021-01-29 PROCEDURE — 99999 PR PBB SHADOW E&M-EST. PATIENT-LVL IV: CPT | Mod: PBBFAC,,, | Performed by: PODIATRIST

## 2021-01-29 PROCEDURE — 87186 SC STD MICRODIL/AGAR DIL: CPT

## 2021-01-29 RX ORDER — MEMANTINE HYDROCHLORIDE 10 MG/1
10 TABLET ORAL 2 TIMES DAILY
COMMUNITY
Start: 2021-01-25

## 2021-02-01 ENCOUNTER — PATIENT MESSAGE (OUTPATIENT)
Dept: PODIATRY | Facility: CLINIC | Age: 51
End: 2021-02-01

## 2021-02-01 DIAGNOSIS — L97.512 ULCER OF RIGHT FOOT, WITH FAT LAYER EXPOSED: Primary | ICD-10-CM

## 2021-02-01 DIAGNOSIS — B37.9 ANTIBIOTIC-INDUCED YEAST INFECTION: Primary | ICD-10-CM

## 2021-02-01 DIAGNOSIS — T36.95XA ANTIBIOTIC-INDUCED YEAST INFECTION: Primary | ICD-10-CM

## 2021-02-01 LAB — BACTERIA SPEC AEROBE CULT: ABNORMAL

## 2021-02-01 RX ORDER — DOXYCYCLINE 100 MG/1
100 CAPSULE ORAL EVERY 12 HOURS
Qty: 20 CAPSULE | Refills: 0 | Status: SHIPPED | OUTPATIENT
Start: 2021-02-01 | End: 2021-02-11

## 2021-02-01 RX ORDER — FLUCONAZOLE 150 MG/1
150 TABLET ORAL
Qty: 2 TABLET | Refills: 0 | Status: SHIPPED | OUTPATIENT
Start: 2021-02-01 | End: 2021-02-05

## 2021-02-02 ENCOUNTER — PATIENT MESSAGE (OUTPATIENT)
Dept: PODIATRY | Facility: CLINIC | Age: 51
End: 2021-02-02

## 2021-02-03 ENCOUNTER — HOSPITAL ENCOUNTER (OUTPATIENT)
Dept: CARDIOLOGY | Facility: HOSPITAL | Age: 51
Discharge: HOME | End: 2021-02-03
Attending: PODIATRIST
Payer: COMMERCIAL

## 2021-02-03 ENCOUNTER — PATIENT MESSAGE (OUTPATIENT)
Dept: PODIATRY | Facility: CLINIC | Age: 51
End: 2021-02-03

## 2021-02-03 VITALS
WEIGHT: 165 LBS | WEIGHT: 165 LBS | BODY MASS INDEX: 26.52 KG/M2 | BODY MASS INDEX: 26.52 KG/M2 | HEIGHT: 66 IN | HEIGHT: 66 IN

## 2021-02-03 DIAGNOSIS — L97.512 ULCER OF RIGHT FOOT, WITH FAT LAYER EXPOSED: ICD-10-CM

## 2021-02-03 LAB
LEFT ABI: 1.37
LEFT ANT TIBIAL SYS PSV: 80 CM/S
LEFT ARM BP: 101 MMHG
LEFT CFA PSV: 148 CM/S
LEFT DORSALIS PEDIS: 138 MMHG
LEFT PERONEAL SYS PSV: 65 CM/S
LEFT POPLITEAL PSV: 56 CM/S
LEFT POST TIBIAL SYS PSV: 64 CM/S
LEFT POSTERIOR TIBIAL: 141 MMHG
LEFT PROFUNDA SYS PSV: 102 CM/S
LEFT SUPER FEMORAL DIST SYS PSV: 111 CM/S
LEFT SUPER FEMORAL MID SYS PSV: 119 CM/S
LEFT SUPER FEMORAL OSTIAL SYS PSV: 82 CM/S
LEFT SUPER FEMORAL PROX SYS PSV: 104 CM/S
LEFT TBI: 1.17
LEFT TIB/PER TRUNK SYS PSV: 55 CM/S
LEFT TOE PRESSURE: 121 MMHG
OHS CV LEFT LOWER EXTREMITY ABI (NO CALC): 1.37
OHS CV RIGHT ABI LOWER EXTREMITY (NO CALC): 1.36
RIGHT ABI: 1.36
RIGHT ANT TIBIAL SYS PSV: 69 CM/S
RIGHT ARM BP: 103 MMHG
RIGHT CFA PSV: 138 CM/S
RIGHT DORSALIS PEDIS: 136 MMHG
RIGHT PERONEAL SYS PSV: 59 CM/S
RIGHT POPLITEAL PSV: 69 CM/S
RIGHT POST TIBIAL SYS PSV: 70 CM/S
RIGHT POSTERIOR TIBIAL: 140 MMHG
RIGHT PROFUNDA SYS PSV: 74 CM/S
RIGHT SUPER FEMORAL DIST SYS PSV: 96 CM/S
RIGHT SUPER FEMORAL MID SYS PSV: 97 CM/S
RIGHT SUPER FEMORAL OSTIAL SYS PSV: 78 CM/S
RIGHT SUPER FEMORAL PROX SYS PSV: 111 CM/S
RIGHT TBI: 0.98
RIGHT TIB/PER TRUNK SYS PSV: 58 CM/S
RIGHT TOE PRESSURE: 101 MMHG

## 2021-02-03 PROCEDURE — 93922 UPR/L XTREMITY ART 2 LEVELS: CPT

## 2021-02-03 PROCEDURE — 93922 UPR/L XTREMITY ART 2 LEVELS: CPT | Mod: 26,,, | Performed by: INTERNAL MEDICINE

## 2021-02-03 PROCEDURE — 93922 ANKLE BRACHIAL INDICES (ABI): ICD-10-PCS | Mod: 26,,, | Performed by: INTERNAL MEDICINE

## 2021-02-03 PROCEDURE — 93925 LOWER EXTREMITY STUDY: CPT | Mod: 26,,, | Performed by: INTERNAL MEDICINE

## 2021-02-03 PROCEDURE — 93925 LOWER EXTREMITY STUDY: CPT

## 2021-02-03 PROCEDURE — 93925 CV US DOPPLER ARTERIAL LEGS BILATERAL (CUPID ONLY): ICD-10-PCS | Mod: 26,,, | Performed by: INTERNAL MEDICINE

## 2021-02-05 ENCOUNTER — OFFICE VISIT (OUTPATIENT)
Dept: PODIATRY | Facility: CLINIC | Age: 51
End: 2021-02-05
Payer: COMMERCIAL

## 2021-02-05 ENCOUNTER — HOSPITAL ENCOUNTER (OUTPATIENT)
Dept: RADIOLOGY | Facility: HOSPITAL | Age: 51
Discharge: HOME | End: 2021-02-05
Attending: PODIATRIST
Payer: COMMERCIAL

## 2021-02-05 VITALS — HEIGHT: 66 IN | BODY MASS INDEX: 26.5 KG/M2 | WEIGHT: 164.88 LBS

## 2021-02-05 DIAGNOSIS — M79.671 PAIN IN RIGHT FOOT: ICD-10-CM

## 2021-02-05 DIAGNOSIS — Z98.890 HISTORY OF FOOT SURGERY: ICD-10-CM

## 2021-02-05 DIAGNOSIS — Z72.0 TOBACCO ABUSE: ICD-10-CM

## 2021-02-05 DIAGNOSIS — G35 MULTIPLE SCLEROSIS: ICD-10-CM

## 2021-02-05 DIAGNOSIS — L97.512 ULCER OF RIGHT FOOT, WITH FAT LAYER EXPOSED: Primary | ICD-10-CM

## 2021-02-05 DIAGNOSIS — M86.171 ACUTE OSTEOMYELITIS OF METATARSAL BONE OF RIGHT FOOT: ICD-10-CM

## 2021-02-05 PROCEDURE — 99213 PR OFFICE/OUTPT VISIT, EST, LEVL III, 20-29 MIN: ICD-10-PCS | Mod: 25,S$GLB,, | Performed by: PODIATRIST

## 2021-02-05 PROCEDURE — 12020 TX SUPFC WND DEHSN SMPL CLSR: CPT | Mod: S$GLB,,, | Performed by: PODIATRIST

## 2021-02-05 PROCEDURE — 99213 OFFICE O/P EST LOW 20 MIN: CPT | Mod: 25,S$GLB,, | Performed by: PODIATRIST

## 2021-02-05 PROCEDURE — 99999 PR PBB SHADOW E&M-EST. PATIENT-LVL III: ICD-10-PCS | Mod: PBBFAC,,, | Performed by: PODIATRIST

## 2021-02-05 PROCEDURE — 12020 PR CLOSURE SUPERF WND DEHIS SIMPLE: ICD-10-PCS | Mod: S$GLB,,, | Performed by: PODIATRIST

## 2021-02-05 PROCEDURE — 99999 PR PBB SHADOW E&M-EST. PATIENT-LVL III: CPT | Mod: PBBFAC,,, | Performed by: PODIATRIST

## 2021-02-05 PROCEDURE — A9503 TC99M MEDRONATE: HCPCS

## 2021-02-05 PROCEDURE — 78315 BONE IMAGING 3 PHASE: CPT | Mod: TC

## 2021-02-08 ENCOUNTER — PATIENT MESSAGE (OUTPATIENT)
Dept: PODIATRY | Facility: CLINIC | Age: 51
End: 2021-02-08

## 2021-02-12 ENCOUNTER — PATIENT MESSAGE (OUTPATIENT)
Dept: PODIATRY | Facility: CLINIC | Age: 51
End: 2021-02-12

## 2021-02-15 ENCOUNTER — PATIENT MESSAGE (OUTPATIENT)
Dept: PODIATRY | Facility: CLINIC | Age: 51
End: 2021-02-15

## 2021-02-17 ENCOUNTER — OFFICE VISIT (OUTPATIENT)
Dept: PODIATRY | Facility: CLINIC | Age: 51
End: 2021-02-17
Payer: COMMERCIAL

## 2021-02-17 VITALS — WEIGHT: 181.69 LBS | HEIGHT: 66 IN | BODY MASS INDEX: 29.2 KG/M2

## 2021-02-17 DIAGNOSIS — L97.512 ULCER OF RIGHT FOOT, WITH FAT LAYER EXPOSED: Primary | ICD-10-CM

## 2021-02-17 DIAGNOSIS — G35 MULTIPLE SCLEROSIS: ICD-10-CM

## 2021-02-17 DIAGNOSIS — M79.671 PAIN IN RIGHT FOOT: ICD-10-CM

## 2021-02-17 DIAGNOSIS — Z72.0 TOBACCO ABUSE: ICD-10-CM

## 2021-02-17 DIAGNOSIS — Z98.890 HISTORY OF FOOT SURGERY: ICD-10-CM

## 2021-02-17 PROCEDURE — 87186 SC STD MICRODIL/AGAR DIL: CPT

## 2021-02-17 PROCEDURE — 11042 DBRDMT SUBQ TIS 1ST 20SQCM/<: CPT | Mod: S$GLB,,, | Performed by: PODIATRIST

## 2021-02-17 PROCEDURE — 99999 PR PBB SHADOW E&M-EST. PATIENT-LVL III: CPT | Mod: PBBFAC,,, | Performed by: PODIATRIST

## 2021-02-17 PROCEDURE — 87070 CULTURE OTHR SPECIMN AEROBIC: CPT

## 2021-02-17 PROCEDURE — 11042 PR DEBRIDEMENT, SKIN, SUB-Q TISSUE,=<20 SQ CM: ICD-10-PCS | Mod: S$GLB,,, | Performed by: PODIATRIST

## 2021-02-17 PROCEDURE — 99214 OFFICE O/P EST MOD 30 MIN: CPT | Mod: 25,S$GLB,, | Performed by: PODIATRIST

## 2021-02-17 PROCEDURE — 99999 PR PBB SHADOW E&M-EST. PATIENT-LVL III: ICD-10-PCS | Mod: PBBFAC,,, | Performed by: PODIATRIST

## 2021-02-17 PROCEDURE — 99214 PR OFFICE/OUTPT VISIT, EST, LEVL IV, 30-39 MIN: ICD-10-PCS | Mod: 25,S$GLB,, | Performed by: PODIATRIST

## 2021-02-17 PROCEDURE — 87077 CULTURE AEROBIC IDENTIFY: CPT

## 2021-02-19 ENCOUNTER — PATIENT MESSAGE (OUTPATIENT)
Dept: PODIATRY | Facility: CLINIC | Age: 51
End: 2021-02-19

## 2021-02-20 ENCOUNTER — PATIENT MESSAGE (OUTPATIENT)
Dept: PODIATRY | Facility: CLINIC | Age: 51
End: 2021-02-20

## 2021-02-20 DIAGNOSIS — T36.95XA ANTIBIOTIC-INDUCED YEAST INFECTION: Primary | ICD-10-CM

## 2021-02-20 DIAGNOSIS — B37.9 ANTIBIOTIC-INDUCED YEAST INFECTION: Primary | ICD-10-CM

## 2021-02-20 DIAGNOSIS — L08.9 RIGHT FOOT INFECTION: ICD-10-CM

## 2021-02-20 LAB — BACTERIA SPEC AEROBE CULT: ABNORMAL

## 2021-02-20 RX ORDER — SULFAMETHOXAZOLE AND TRIMETHOPRIM 800; 160 MG/1; MG/1
1 TABLET ORAL 2 TIMES DAILY
Qty: 20 TABLET | Refills: 0 | Status: SHIPPED | OUTPATIENT
Start: 2021-02-20 | End: 2021-03-02 | Stop reason: SDUPTHER

## 2021-02-20 RX ORDER — FLUCONAZOLE 150 MG/1
150 TABLET ORAL
Qty: 2 TABLET | Refills: 0 | Status: SHIPPED | OUTPATIENT
Start: 2021-02-20 | End: 2021-02-23

## 2021-02-25 ENCOUNTER — PATIENT MESSAGE (OUTPATIENT)
Dept: PODIATRY | Facility: CLINIC | Age: 51
End: 2021-02-25

## 2021-02-25 DIAGNOSIS — L97.512 ULCER OF RIGHT FOOT, WITH FAT LAYER EXPOSED: Primary | ICD-10-CM

## 2021-02-25 DIAGNOSIS — Z98.890 HISTORY OF FOOT SURGERY: ICD-10-CM

## 2021-02-25 DIAGNOSIS — Z72.0 TOBACCO ABUSE: ICD-10-CM

## 2021-02-25 DIAGNOSIS — G35 MULTIPLE SCLEROSIS: ICD-10-CM

## 2021-02-27 ENCOUNTER — PATIENT MESSAGE (OUTPATIENT)
Dept: PODIATRY | Facility: CLINIC | Age: 51
End: 2021-02-27

## 2021-03-02 ENCOUNTER — OFFICE VISIT (OUTPATIENT)
Dept: PODIATRY | Facility: CLINIC | Age: 51
End: 2021-03-02
Payer: COMMERCIAL

## 2021-03-02 VITALS — BODY MASS INDEX: 29.32 KG/M2 | HEIGHT: 66 IN

## 2021-03-02 DIAGNOSIS — Z98.890 HISTORY OF FOOT SURGERY: ICD-10-CM

## 2021-03-02 DIAGNOSIS — Z72.0 TOBACCO ABUSE: ICD-10-CM

## 2021-03-02 DIAGNOSIS — G35 MULTIPLE SCLEROSIS: ICD-10-CM

## 2021-03-02 DIAGNOSIS — L97.512 ULCER OF RIGHT FOOT, WITH FAT LAYER EXPOSED: Primary | ICD-10-CM

## 2021-03-02 DIAGNOSIS — L08.9 RIGHT FOOT INFECTION: ICD-10-CM

## 2021-03-02 PROCEDURE — 11042 PR DEBRIDEMENT, SKIN, SUB-Q TISSUE,=<20 SQ CM: ICD-10-PCS | Mod: S$GLB,,, | Performed by: PODIATRIST

## 2021-03-02 PROCEDURE — 99999 PR PBB SHADOW E&M-EST. PATIENT-LVL III: CPT | Mod: PBBFAC,,, | Performed by: PODIATRIST

## 2021-03-02 PROCEDURE — 99214 PR OFFICE/OUTPT VISIT, EST, LEVL IV, 30-39 MIN: ICD-10-PCS | Mod: 25,S$GLB,, | Performed by: PODIATRIST

## 2021-03-02 PROCEDURE — 99214 OFFICE O/P EST MOD 30 MIN: CPT | Mod: 25,S$GLB,, | Performed by: PODIATRIST

## 2021-03-02 PROCEDURE — 99999 PR PBB SHADOW E&M-EST. PATIENT-LVL III: ICD-10-PCS | Mod: PBBFAC,,, | Performed by: PODIATRIST

## 2021-03-02 PROCEDURE — 11042 DBRDMT SUBQ TIS 1ST 20SQCM/<: CPT | Mod: S$GLB,,, | Performed by: PODIATRIST

## 2021-03-02 RX ORDER — SULFAMETHOXAZOLE AND TRIMETHOPRIM 800; 160 MG/1; MG/1
1 TABLET ORAL 2 TIMES DAILY
Qty: 20 TABLET | Refills: 0 | Status: SHIPPED | OUTPATIENT
Start: 2021-03-02 | End: 2021-03-10 | Stop reason: SDUPTHER

## 2021-03-08 ENCOUNTER — PATIENT MESSAGE (OUTPATIENT)
Dept: PODIATRY | Facility: CLINIC | Age: 51
End: 2021-03-08

## 2021-03-10 ENCOUNTER — OFFICE VISIT (OUTPATIENT)
Dept: PODIATRY | Facility: CLINIC | Age: 51
End: 2021-03-10
Payer: COMMERCIAL

## 2021-03-10 ENCOUNTER — HOSPITAL ENCOUNTER (OUTPATIENT)
Dept: RADIOLOGY | Facility: HOSPITAL | Age: 51
Discharge: HOME | End: 2021-03-10
Attending: PODIATRIST
Payer: COMMERCIAL

## 2021-03-10 ENCOUNTER — HOSPITAL ENCOUNTER (OUTPATIENT)
Dept: CARDIOLOGY | Facility: HOSPITAL | Age: 51
Discharge: HOME | End: 2021-03-10
Attending: PODIATRIST
Payer: COMMERCIAL

## 2021-03-10 VITALS — WEIGHT: 181.69 LBS | BODY MASS INDEX: 29.2 KG/M2 | HEIGHT: 66 IN

## 2021-03-10 DIAGNOSIS — T84.84XA PAINFUL ORTHOPAEDIC HARDWARE: ICD-10-CM

## 2021-03-10 DIAGNOSIS — Z01.818 PREOP TESTING: Primary | ICD-10-CM

## 2021-03-10 DIAGNOSIS — L97.512 ULCER OF RIGHT FOOT, WITH FAT LAYER EXPOSED: Primary | ICD-10-CM

## 2021-03-10 DIAGNOSIS — Z01.818 PREOP TESTING: ICD-10-CM

## 2021-03-10 DIAGNOSIS — D80.3 SELECTIVE DEFICIENCY OF IMMUNOGLOBULIN G (IGG) SUBCLASSES: ICD-10-CM

## 2021-03-10 DIAGNOSIS — Z01.818 PREOP EXAMINATION: ICD-10-CM

## 2021-03-10 DIAGNOSIS — Z98.890 HISTORY OF FOOT SURGERY: ICD-10-CM

## 2021-03-10 DIAGNOSIS — Z72.0 TOBACCO ABUSE: ICD-10-CM

## 2021-03-10 DIAGNOSIS — G35 MULTIPLE SCLEROSIS: ICD-10-CM

## 2021-03-10 PROCEDURE — 93010 EKG 12-LEAD: ICD-10-PCS | Mod: ,,, | Performed by: INTERNAL MEDICINE

## 2021-03-10 PROCEDURE — 99214 OFFICE O/P EST MOD 30 MIN: CPT | Mod: S$GLB,,, | Performed by: PODIATRIST

## 2021-03-10 PROCEDURE — 93010 ELECTROCARDIOGRAM REPORT: CPT | Mod: ,,, | Performed by: INTERNAL MEDICINE

## 2021-03-10 PROCEDURE — 71046 XR CHEST PA AND LATERAL: ICD-10-PCS | Mod: 26,,, | Performed by: RADIOLOGY

## 2021-03-10 PROCEDURE — 99214 PR OFFICE/OUTPT VISIT, EST, LEVL IV, 30-39 MIN: ICD-10-PCS | Mod: S$GLB,,, | Performed by: PODIATRIST

## 2021-03-10 PROCEDURE — 71046 X-RAY EXAM CHEST 2 VIEWS: CPT | Mod: TC

## 2021-03-10 PROCEDURE — 93005 ELECTROCARDIOGRAM TRACING: CPT

## 2021-03-10 PROCEDURE — 99999 PR PBB SHADOW E&M-EST. PATIENT-LVL III: CPT | Mod: PBBFAC,,, | Performed by: PODIATRIST

## 2021-03-10 PROCEDURE — 99999 PR PBB SHADOW E&M-EST. PATIENT-LVL III: ICD-10-PCS | Mod: PBBFAC,,, | Performed by: PODIATRIST

## 2021-03-10 PROCEDURE — 71046 X-RAY EXAM CHEST 2 VIEWS: CPT | Mod: 26,,, | Performed by: RADIOLOGY

## 2021-03-10 RX ORDER — SULFAMETHOXAZOLE AND TRIMETHOPRIM 800; 160 MG/1; MG/1
1 TABLET ORAL 2 TIMES DAILY
Qty: 20 TABLET | Refills: 0 | Status: SHIPPED | OUTPATIENT
Start: 2021-03-10 | End: 2021-03-20

## 2021-03-10 RX ORDER — SULFAMETHOXAZOLE AND TRIMETHOPRIM 800; 160 MG/1; MG/1
1 TABLET ORAL 2 TIMES DAILY
Qty: 20 TABLET | Refills: 0 | Status: SHIPPED | OUTPATIENT
Start: 2021-03-10 | End: 2021-03-10

## 2021-03-10 RX ORDER — OXYCODONE AND ACETAMINOPHEN 10; 325 MG/1; MG/1
1 TABLET ORAL EVERY 6 HOURS PRN
Qty: 28 TABLET | Refills: 0 | Status: SHIPPED | OUTPATIENT
Start: 2021-03-10 | End: 2021-03-17

## 2021-03-11 ENCOUNTER — PATIENT MESSAGE (OUTPATIENT)
Dept: SURGERY | Facility: HOSPITAL | Age: 51
End: 2021-03-11

## 2021-03-11 RX ORDER — IBUPROFEN AND FAMOTIDINE 26.6; 8 MG/1; MG/1
800 TABLET ORAL 3 TIMES DAILY
COMMUNITY

## 2021-03-11 RX ORDER — SUVOREXANT 10 MG/1
10 TABLET, FILM COATED ORAL NIGHTLY
COMMUNITY

## 2021-03-11 RX ORDER — QUETIAPINE FUMARATE 100 MG/1
100 TABLET, FILM COATED ORAL NIGHTLY
COMMUNITY

## 2021-03-12 ENCOUNTER — PATIENT MESSAGE (OUTPATIENT)
Dept: SURGERY | Facility: HOSPITAL | Age: 51
End: 2021-03-12

## 2021-03-12 ENCOUNTER — LAB VISIT (OUTPATIENT)
Dept: OTOLARYNGOLOGY | Facility: CLINIC | Age: 51
End: 2021-03-12
Payer: COMMERCIAL

## 2021-03-12 ENCOUNTER — TELEPHONE (OUTPATIENT)
Dept: PODIATRY | Facility: CLINIC | Age: 51
End: 2021-03-12

## 2021-03-12 DIAGNOSIS — Z01.818 PREOP EXAMINATION: ICD-10-CM

## 2021-03-12 PROCEDURE — U0005 INFEC AGEN DETEC AMPLI PROBE: HCPCS | Performed by: PODIATRIST

## 2021-03-12 PROCEDURE — U0003 INFECTIOUS AGENT DETECTION BY NUCLEIC ACID (DNA OR RNA); SEVERE ACUTE RESPIRATORY SYNDROME CORONAVIRUS 2 (SARS-COV-2) (CORONAVIRUS DISEASE [COVID-19]), AMPLIFIED PROBE TECHNIQUE, MAKING USE OF HIGH THROUGHPUT TECHNOLOGIES AS DESCRIBED BY CMS-2020-01-R: HCPCS | Performed by: PODIATRIST

## 2021-03-13 LAB — SARS-COV-2 RNA RESP QL NAA+PROBE: NOT DETECTED

## 2021-03-15 ENCOUNTER — ANESTHESIA (OUTPATIENT)
Dept: SURGERY | Facility: HOSPITAL | Age: 51
End: 2021-03-15
Payer: COMMERCIAL

## 2021-03-15 ENCOUNTER — ANESTHESIA EVENT (OUTPATIENT)
Dept: SURGERY | Facility: HOSPITAL | Age: 51
End: 2021-03-15
Payer: COMMERCIAL

## 2021-03-15 ENCOUNTER — HOSPITAL ENCOUNTER (OUTPATIENT)
Facility: HOSPITAL | Age: 51
Discharge: HOME | End: 2021-03-15
Attending: PODIATRIST | Admitting: PODIATRIST
Payer: COMMERCIAL

## 2021-03-15 DIAGNOSIS — T84.84XA PAINFUL ORTHOPAEDIC HARDWARE: ICD-10-CM

## 2021-03-15 PROCEDURE — 63600175 PHARM REV CODE 636 W HCPCS: Performed by: NURSE ANESTHETIST, CERTIFIED REGISTERED

## 2021-03-15 PROCEDURE — 20680 PR REMOVAL DEEP IMPLANT: ICD-10-PCS | Mod: RT,,, | Performed by: PODIATRIST

## 2021-03-15 PROCEDURE — 25000003 PHARM REV CODE 250: Performed by: NURSE ANESTHETIST, CERTIFIED REGISTERED

## 2021-03-15 PROCEDURE — 25000003 PHARM REV CODE 250: Performed by: PODIATRIST

## 2021-03-15 PROCEDURE — 20702 PR MANUAL PREP&INSJ INTRAMEDULLARY DRUG DLVR DEVICE: ICD-10-PCS | Mod: ,,, | Performed by: PODIATRIST

## 2021-03-15 PROCEDURE — 71000033 HC RECOVERY, INTIAL HOUR: Performed by: PODIATRIST

## 2021-03-15 PROCEDURE — 36000706: Performed by: PODIATRIST

## 2021-03-15 PROCEDURE — 63600175 PHARM REV CODE 636 W HCPCS: Performed by: ANESTHESIOLOGY

## 2021-03-15 PROCEDURE — 88300 PR  SURG PATH,GROSS,LEVEL I: ICD-10-PCS | Mod: 26,59,, | Performed by: PATHOLOGY

## 2021-03-15 PROCEDURE — 88305 TISSUE EXAM BY PATHOLOGIST: ICD-10-PCS | Mod: 26,,, | Performed by: PATHOLOGY

## 2021-03-15 PROCEDURE — 20702 MNL PREP&INSJ IMED RX DEV: CPT | Mod: ,,, | Performed by: PODIATRIST

## 2021-03-15 PROCEDURE — 88300 SURGICAL PATH GROSS: CPT | Performed by: PATHOLOGY

## 2021-03-15 PROCEDURE — C1713 ANCHOR/SCREW BN/BN,TIS/BN: HCPCS | Performed by: PODIATRIST

## 2021-03-15 PROCEDURE — 20680 REMOVAL OF IMPLANT DEEP: CPT | Mod: RT,,, | Performed by: PODIATRIST

## 2021-03-15 PROCEDURE — 88305 TISSUE EXAM BY PATHOLOGIST: CPT | Mod: 26,,, | Performed by: PATHOLOGY

## 2021-03-15 PROCEDURE — 88305 TISSUE EXAM BY PATHOLOGIST: CPT | Performed by: PATHOLOGY

## 2021-03-15 PROCEDURE — 36000707: Performed by: PODIATRIST

## 2021-03-15 PROCEDURE — 37000009 HC ANESTHESIA EA ADD 15 MINS: Performed by: PODIATRIST

## 2021-03-15 PROCEDURE — 71000015 HC POSTOP RECOV 1ST HR: Performed by: PODIATRIST

## 2021-03-15 PROCEDURE — 37000008 HC ANESTHESIA 1ST 15 MINUTES: Performed by: PODIATRIST

## 2021-03-15 PROCEDURE — 63600175 PHARM REV CODE 636 W HCPCS

## 2021-03-15 PROCEDURE — 88311 DECALCIFY TISSUE: CPT | Performed by: PATHOLOGY

## 2021-03-15 PROCEDURE — 25000003 PHARM REV CODE 250: Performed by: ANESTHESIOLOGY

## 2021-03-15 PROCEDURE — 15275 PR SKIN SUB GRAFT FACE/NK/HF/G UP TO 100 SQCM: ICD-10-PCS | Mod: 51,,, | Performed by: PODIATRIST

## 2021-03-15 PROCEDURE — 15275 SKIN SUB GRAFT FACE/NK/HF/G: CPT | Mod: 51,,, | Performed by: PODIATRIST

## 2021-03-15 PROCEDURE — 88300 SURGICAL PATH GROSS: CPT | Mod: 26,59,, | Performed by: PATHOLOGY

## 2021-03-15 PROCEDURE — 63600175 PHARM REV CODE 636 W HCPCS: Performed by: PODIATRIST

## 2021-03-15 DEVICE — KIT GRAFT BONE/SUB STIM 10ML: Type: IMPLANTABLE DEVICE | Site: FOOT | Status: FUNCTIONAL

## 2021-03-15 DEVICE — MATRIX PURAPLYAM ANTIMIC 2X4CM: Type: IMPLANTABLE DEVICE | Site: FOOT | Status: FUNCTIONAL

## 2021-03-15 RX ORDER — FENTANYL CITRATE 50 UG/ML
INJECTION, SOLUTION INTRAMUSCULAR; INTRAVENOUS
Status: DISCONTINUED | OUTPATIENT
Start: 2021-03-15 | End: 2021-03-15

## 2021-03-15 RX ORDER — ONDANSETRON 2 MG/ML
4 INJECTION INTRAMUSCULAR; INTRAVENOUS DAILY PRN
Status: DISCONTINUED | OUTPATIENT
Start: 2021-03-15 | End: 2021-03-15 | Stop reason: HOSPADM

## 2021-03-15 RX ORDER — LIDOCAINE HYDROCHLORIDE 20 MG/ML
INJECTION, SOLUTION EPIDURAL; INFILTRATION; INTRACAUDAL; PERINEURAL
Status: DISCONTINUED | OUTPATIENT
Start: 2021-03-15 | End: 2021-03-15 | Stop reason: HOSPADM

## 2021-03-15 RX ORDER — MIDAZOLAM HYDROCHLORIDE 1 MG/ML
INJECTION, SOLUTION INTRAMUSCULAR; INTRAVENOUS
Status: DISCONTINUED | OUTPATIENT
Start: 2021-03-15 | End: 2021-03-15

## 2021-03-15 RX ORDER — OXYCODONE AND ACETAMINOPHEN 5; 325 MG/1; MG/1
1 TABLET ORAL
Status: DISCONTINUED | OUTPATIENT
Start: 2021-03-15 | End: 2021-03-15 | Stop reason: HOSPADM

## 2021-03-15 RX ORDER — VANCOMYCIN HYDROCHLORIDE 1 G/20ML
INJECTION, POWDER, LYOPHILIZED, FOR SOLUTION INTRAVENOUS
Status: DISCONTINUED | OUTPATIENT
Start: 2021-03-15 | End: 2021-03-15 | Stop reason: HOSPADM

## 2021-03-15 RX ORDER — KETOROLAC TROMETHAMINE 30 MG/ML
15 INJECTION, SOLUTION INTRAMUSCULAR; INTRAVENOUS EVERY 8 HOURS PRN
Status: DISCONTINUED | OUTPATIENT
Start: 2021-03-15 | End: 2021-03-15 | Stop reason: HOSPADM

## 2021-03-15 RX ORDER — HYDROMORPHONE HYDROCHLORIDE 2 MG/ML
0.2 INJECTION, SOLUTION INTRAMUSCULAR; INTRAVENOUS; SUBCUTANEOUS EVERY 5 MIN PRN
Status: DISCONTINUED | OUTPATIENT
Start: 2021-03-15 | End: 2021-03-15 | Stop reason: HOSPADM

## 2021-03-15 RX ORDER — LIDOCAINE HYDROCHLORIDE 20 MG/ML
INJECTION INTRAVENOUS
Status: DISCONTINUED | OUTPATIENT
Start: 2021-03-15 | End: 2021-03-15

## 2021-03-15 RX ORDER — CLINDAMYCIN PHOSPHATE 900 MG/50ML
900 INJECTION, SOLUTION INTRAVENOUS
Status: COMPLETED | OUTPATIENT
Start: 2021-03-15 | End: 2021-03-15

## 2021-03-15 RX ORDER — EPHEDRINE SULFATE 50 MG/ML
INJECTION, SOLUTION INTRAVENOUS
Status: DISCONTINUED | OUTPATIENT
Start: 2021-03-15 | End: 2021-03-15

## 2021-03-15 RX ORDER — PROPOFOL 10 MG/ML
VIAL (ML) INTRAVENOUS CONTINUOUS PRN
Status: DISCONTINUED | OUTPATIENT
Start: 2021-03-15 | End: 2021-03-15

## 2021-03-15 RX ADMIN — FENTANYL CITRATE 100 MCG: 50 INJECTION, SOLUTION INTRAMUSCULAR; INTRAVENOUS at 08:03

## 2021-03-15 RX ADMIN — CLINDAMYCIN PHOSPHATE 900 MG: 18 INJECTION, SOLUTION INTRAVENOUS at 08:03

## 2021-03-15 RX ADMIN — OXYCODONE HYDROCHLORIDE AND ACETAMINOPHEN 1 TABLET: 5; 325 TABLET ORAL at 09:03

## 2021-03-15 RX ADMIN — LIDOCAINE HYDROCHLORIDE 100 MG: 20 INJECTION, SOLUTION INTRAVENOUS at 08:03

## 2021-03-15 RX ADMIN — EPHEDRINE SULFATE 50 MG: 50 INJECTION INTRAVENOUS at 08:03

## 2021-03-15 RX ADMIN — KETOROLAC TROMETHAMINE 15 MG: 30 INJECTION, SOLUTION INTRAMUSCULAR at 09:03

## 2021-03-15 RX ADMIN — PROPOFOL 50 MCG/KG/MIN: 10 INJECTION, EMULSION INTRAVENOUS at 08:03

## 2021-03-15 RX ADMIN — MIDAZOLAM HYDROCHLORIDE 2 MG: 1 INJECTION, SOLUTION INTRAMUSCULAR; INTRAVENOUS at 08:03

## 2021-03-18 LAB
COMMENT: NORMAL
FINAL PATHOLOGIC DIAGNOSIS: NORMAL
GROSS: NORMAL
Lab: NORMAL

## 2021-03-25 VITALS
RESPIRATION RATE: 16 BRPM | HEIGHT: 66 IN | OXYGEN SATURATION: 99 % | SYSTOLIC BLOOD PRESSURE: 100 MMHG | HEART RATE: 80 BPM | BODY MASS INDEX: 28.93 KG/M2 | TEMPERATURE: 98 F | WEIGHT: 180 LBS | DIASTOLIC BLOOD PRESSURE: 51 MMHG

## 2021-03-26 ENCOUNTER — OFFICE VISIT (OUTPATIENT)
Dept: PODIATRY | Facility: CLINIC | Age: 51
End: 2021-03-26
Payer: COMMERCIAL

## 2021-03-26 VITALS — HEIGHT: 66 IN | BODY MASS INDEX: 28.91 KG/M2 | WEIGHT: 179.88 LBS

## 2021-03-26 DIAGNOSIS — Z09 POSTOP CHECK: Primary | ICD-10-CM

## 2021-03-26 DIAGNOSIS — L97.512 ULCER OF RIGHT FOOT, WITH FAT LAYER EXPOSED: ICD-10-CM

## 2021-03-26 DIAGNOSIS — T84.84XA PAINFUL ORTHOPAEDIC HARDWARE: ICD-10-CM

## 2021-03-26 DIAGNOSIS — Z72.0 TOBACCO ABUSE: ICD-10-CM

## 2021-03-26 DIAGNOSIS — Z98.890 HISTORY OF FOOT SURGERY: ICD-10-CM

## 2021-03-26 DIAGNOSIS — G35 MULTIPLE SCLEROSIS: ICD-10-CM

## 2021-03-26 PROCEDURE — 99999 PR PBB SHADOW E&M-EST. PATIENT-LVL III: ICD-10-PCS | Mod: PBBFAC,,, | Performed by: PODIATRIST

## 2021-03-26 PROCEDURE — 99999 PR PBB SHADOW E&M-EST. PATIENT-LVL III: CPT | Mod: PBBFAC,,, | Performed by: PODIATRIST

## 2021-03-26 PROCEDURE — 99024 POSTOP FOLLOW-UP VISIT: CPT | Mod: S$GLB,,, | Performed by: PODIATRIST

## 2021-03-26 PROCEDURE — 99024 PR POST-OP FOLLOW-UP VISIT: ICD-10-PCS | Mod: S$GLB,,, | Performed by: PODIATRIST

## 2021-04-05 ENCOUNTER — OFFICE VISIT (OUTPATIENT)
Dept: PODIATRY | Facility: CLINIC | Age: 51
End: 2021-04-05
Payer: COMMERCIAL

## 2021-04-05 VITALS — BODY MASS INDEX: 28.83 KG/M2 | WEIGHT: 179.38 LBS | HEIGHT: 66 IN

## 2021-04-05 DIAGNOSIS — L97.512 ULCER OF RIGHT FOOT, WITH FAT LAYER EXPOSED: ICD-10-CM

## 2021-04-05 DIAGNOSIS — G35 MULTIPLE SCLEROSIS: ICD-10-CM

## 2021-04-05 DIAGNOSIS — T84.84XA PAINFUL ORTHOPAEDIC HARDWARE: ICD-10-CM

## 2021-04-05 DIAGNOSIS — Z72.0 TOBACCO ABUSE: ICD-10-CM

## 2021-04-05 DIAGNOSIS — Z98.890 HISTORY OF FOOT SURGERY: ICD-10-CM

## 2021-04-05 DIAGNOSIS — Z09 POSTOP CHECK: Primary | ICD-10-CM

## 2021-04-05 PROCEDURE — 99024 POSTOP FOLLOW-UP VISIT: CPT | Mod: S$GLB,,, | Performed by: PODIATRIST

## 2021-04-05 PROCEDURE — 99999 PR PBB SHADOW E&M-EST. PATIENT-LVL III: ICD-10-PCS | Mod: PBBFAC,,, | Performed by: PODIATRIST

## 2021-04-05 PROCEDURE — 99024 PR POST-OP FOLLOW-UP VISIT: ICD-10-PCS | Mod: S$GLB,,, | Performed by: PODIATRIST

## 2021-04-05 PROCEDURE — 99999 PR PBB SHADOW E&M-EST. PATIENT-LVL III: CPT | Mod: PBBFAC,,, | Performed by: PODIATRIST

## 2021-04-16 ENCOUNTER — PATIENT MESSAGE (OUTPATIENT)
Dept: PODIATRY | Facility: CLINIC | Age: 51
End: 2021-04-16

## 2021-04-28 ENCOUNTER — PATIENT MESSAGE (OUTPATIENT)
Dept: RESEARCH | Facility: HOSPITAL | Age: 51
End: 2021-04-28

## 2021-12-21 ENCOUNTER — PATIENT MESSAGE (OUTPATIENT)
Dept: NEUROLOGY | Facility: CLINIC | Age: 51
End: 2021-12-21
Payer: COMMERCIAL

## 2022-02-28 ENCOUNTER — PATIENT MESSAGE (OUTPATIENT)
Dept: PSYCHIATRY | Facility: CLINIC | Age: 52
End: 2022-02-28
Payer: COMMERCIAL

## 2022-06-24 ENCOUNTER — PATIENT MESSAGE (OUTPATIENT)
Dept: PSYCHIATRY | Facility: CLINIC | Age: 52
End: 2022-06-24
Payer: COMMERCIAL

## 2022-09-16 ENCOUNTER — HOSPITAL ENCOUNTER (EMERGENCY)
Facility: HOSPITAL | Age: 52
Discharge: HOME | End: 2022-09-16
Attending: EMERGENCY MEDICINE
Payer: COMMERCIAL

## 2022-09-16 VITALS
WEIGHT: 199 LBS | DIASTOLIC BLOOD PRESSURE: 74 MMHG | OXYGEN SATURATION: 95 % | SYSTOLIC BLOOD PRESSURE: 136 MMHG | HEART RATE: 84 BPM | BODY MASS INDEX: 32.12 KG/M2 | RESPIRATION RATE: 18 BRPM | TEMPERATURE: 98 F

## 2022-09-16 DIAGNOSIS — N39.0 URINARY TRACT INFECTION WITH HEMATURIA, SITE UNSPECIFIED: ICD-10-CM

## 2022-09-16 DIAGNOSIS — R10.13 EPIGASTRIC PAIN: Primary | ICD-10-CM

## 2022-09-16 DIAGNOSIS — R31.9 URINARY TRACT INFECTION WITH HEMATURIA, SITE UNSPECIFIED: ICD-10-CM

## 2022-09-16 DIAGNOSIS — R07.9 CHEST PAIN: ICD-10-CM

## 2022-09-16 LAB
ALBUMIN SERPL BCP-MCNC: 4.2 G/DL (ref 3.5–5.2)
ALP SERPL-CCNC: 106 U/L (ref 55–135)
ALT SERPL W/O P-5'-P-CCNC: 30 U/L (ref 10–44)
ANION GAP SERPL CALC-SCNC: 13 MMOL/L (ref 8–16)
AST SERPL-CCNC: 25 U/L (ref 10–40)
BASOPHILS # BLD AUTO: 0.04 K/UL (ref 0–0.2)
BASOPHILS NFR BLD: 0.6 % (ref 0–1.9)
BILIRUB SERPL-MCNC: 1 MG/DL (ref 0.1–1)
BILIRUB UR QL STRIP: NEGATIVE
BNP SERPL-MCNC: 41 PG/ML (ref 0–99)
BUN SERPL-MCNC: 10 MG/DL (ref 6–20)
CALCIUM SERPL-MCNC: 11.1 MG/DL (ref 8.7–10.5)
CHLORIDE SERPL-SCNC: 100 MMOL/L (ref 95–110)
CLARITY UR: ABNORMAL
CO2 SERPL-SCNC: 25 MMOL/L (ref 23–29)
COLOR UR: YELLOW
CREAT SERPL-MCNC: 0.8 MG/DL (ref 0.5–1.4)
DIFFERENTIAL METHOD: ABNORMAL
EOSINOPHIL # BLD AUTO: 0 K/UL (ref 0–0.5)
EOSINOPHIL NFR BLD: 0.6 % (ref 0–8)
ERYTHROCYTE [DISTWIDTH] IN BLOOD BY AUTOMATED COUNT: 11.5 % (ref 11.5–14.5)
EST. GFR  (NO RACE VARIABLE): >60 ML/MIN/1.73 M^2
GLUCOSE SERPL-MCNC: 105 MG/DL (ref 70–110)
GLUCOSE UR QL STRIP: NEGATIVE
HCT VFR BLD AUTO: 38.5 % (ref 37–48.5)
HGB BLD-MCNC: 13.3 G/DL (ref 12–16)
HGB UR QL STRIP: ABNORMAL
IMM GRANULOCYTES # BLD AUTO: 0.02 K/UL (ref 0–0.04)
IMM GRANULOCYTES NFR BLD AUTO: 0.3 % (ref 0–0.5)
KETONES UR QL STRIP: NEGATIVE
LEUKOCYTE ESTERASE UR QL STRIP: ABNORMAL
LIPASE SERPL-CCNC: 23 U/L (ref 4–60)
LYMPHOCYTES # BLD AUTO: 0.8 K/UL (ref 1–4.8)
LYMPHOCYTES NFR BLD: 11.4 % (ref 18–48)
MCH RBC QN AUTO: 32.4 PG (ref 27–31)
MCHC RBC AUTO-ENTMCNC: 34.5 G/DL (ref 32–36)
MCV RBC AUTO: 94 FL (ref 82–98)
MICROSCOPIC COMMENT: ABNORMAL
MONOCYTES # BLD AUTO: 0.6 K/UL (ref 0.3–1)
MONOCYTES NFR BLD: 8 % (ref 4–15)
NEUTROPHILS # BLD AUTO: 5.6 K/UL (ref 1.8–7.7)
NEUTROPHILS NFR BLD: 79.1 % (ref 38–73)
NITRITE UR QL STRIP: NEGATIVE
NRBC BLD-RTO: 0 /100 WBC
PH UR STRIP: 6 [PH] (ref 5–8)
PLATELET # BLD AUTO: 244 K/UL (ref 150–450)
PMV BLD AUTO: 10.5 FL (ref 9.2–12.9)
POTASSIUM SERPL-SCNC: 3.7 MMOL/L (ref 3.5–5.1)
PROT SERPL-MCNC: 7.9 G/DL (ref 6–8.4)
PROT UR QL STRIP: ABNORMAL
RBC # BLD AUTO: 4.11 M/UL (ref 4–5.4)
RBC #/AREA URNS HPF: 9 /HPF (ref 0–4)
SODIUM SERPL-SCNC: 138 MMOL/L (ref 136–145)
SP GR UR STRIP: 1.02 (ref 1–1.03)
SQUAMOUS #/AREA URNS HPF: 14 /HPF
TROPONIN I SERPL DL<=0.01 NG/ML-MCNC: <0.006 NG/ML (ref 0–0.03)
UNIDENT CRYS URNS QL MICRO: ABNORMAL
URN SPEC COLLECT METH UR: ABNORMAL
UROBILINOGEN UR STRIP-ACNC: NEGATIVE EU/DL
WBC # BLD AUTO: 7.02 K/UL (ref 3.9–12.7)
WBC #/AREA URNS HPF: 8 /HPF (ref 0–5)
WBC CLUMPS URNS QL MICRO: ABNORMAL

## 2022-09-16 PROCEDURE — 83880 ASSAY OF NATRIURETIC PEPTIDE: CPT | Performed by: EMERGENCY MEDICINE

## 2022-09-16 PROCEDURE — 96365 THER/PROPH/DIAG IV INF INIT: CPT

## 2022-09-16 PROCEDURE — 80053 COMPREHEN METABOLIC PANEL: CPT | Performed by: NURSE PRACTITIONER

## 2022-09-16 PROCEDURE — 63600175 PHARM REV CODE 636 W HCPCS: Performed by: EMERGENCY MEDICINE

## 2022-09-16 PROCEDURE — 83690 ASSAY OF LIPASE: CPT | Performed by: NURSE PRACTITIONER

## 2022-09-16 PROCEDURE — 85025 COMPLETE CBC W/AUTO DIFF WBC: CPT | Performed by: NURSE PRACTITIONER

## 2022-09-16 PROCEDURE — 25000003 PHARM REV CODE 250: Performed by: EMERGENCY MEDICINE

## 2022-09-16 PROCEDURE — 84484 ASSAY OF TROPONIN QUANT: CPT | Performed by: EMERGENCY MEDICINE

## 2022-09-16 PROCEDURE — 96361 HYDRATE IV INFUSION ADD-ON: CPT

## 2022-09-16 PROCEDURE — 96375 TX/PRO/DX INJ NEW DRUG ADDON: CPT

## 2022-09-16 PROCEDURE — 99285 EMERGENCY DEPT VISIT HI MDM: CPT | Mod: 25

## 2022-09-16 PROCEDURE — 81000 URINALYSIS NONAUTO W/SCOPE: CPT | Performed by: NURSE PRACTITIONER

## 2022-09-16 RX ORDER — MORPHINE SULFATE 4 MG/ML
4 INJECTION, SOLUTION INTRAMUSCULAR; INTRAVENOUS
Status: COMPLETED | OUTPATIENT
Start: 2022-09-16 | End: 2022-09-16

## 2022-09-16 RX ORDER — CIPROFLOXACIN 500 MG/1
500 TABLET ORAL 2 TIMES DAILY
Qty: 20 TABLET | Refills: 0 | Status: SHIPPED | OUTPATIENT
Start: 2022-09-16 | End: 2022-09-26

## 2022-09-16 RX ORDER — ONDANSETRON 2 MG/ML
4 INJECTION INTRAMUSCULAR; INTRAVENOUS
Status: COMPLETED | OUTPATIENT
Start: 2022-09-16 | End: 2022-09-16

## 2022-09-16 RX ORDER — KETOROLAC TROMETHAMINE 30 MG/ML
15 INJECTION, SOLUTION INTRAMUSCULAR; INTRAVENOUS
Status: COMPLETED | OUTPATIENT
Start: 2022-09-16 | End: 2022-09-16

## 2022-09-16 RX ORDER — METRONIDAZOLE 500 MG/1
500 TABLET ORAL 3 TIMES DAILY
Qty: 21 TABLET | Refills: 0 | Status: SHIPPED | OUTPATIENT
Start: 2022-09-16 | End: 2022-09-26

## 2022-09-16 RX ORDER — ONDANSETRON 4 MG/1
4 TABLET, FILM COATED ORAL EVERY 6 HOURS
Qty: 30 TABLET | Refills: 0 | Status: SHIPPED | OUTPATIENT
Start: 2022-09-16

## 2022-09-16 RX ORDER — CIPROFLOXACIN 2 MG/ML
400 INJECTION, SOLUTION INTRAVENOUS
Status: DISCONTINUED | OUTPATIENT
Start: 2022-09-16 | End: 2022-09-16 | Stop reason: HOSPADM

## 2022-09-16 RX ORDER — HYDROCODONE BITARTRATE AND ACETAMINOPHEN 7.5; 325 MG/1; MG/1
1 TABLET ORAL EVERY 4 HOURS PRN
Qty: 24 TABLET | Refills: 0 | Status: SHIPPED | OUTPATIENT
Start: 2022-09-16

## 2022-09-16 RX ORDER — METRONIDAZOLE 500 MG/1
500 TABLET ORAL
Status: COMPLETED | OUTPATIENT
Start: 2022-09-16 | End: 2022-09-16

## 2022-09-16 RX ORDER — HYDROCODONE BITARTRATE AND ACETAMINOPHEN 10; 325 MG/1; MG/1
1 TABLET ORAL
Status: COMPLETED | OUTPATIENT
Start: 2022-09-16 | End: 2022-09-16

## 2022-09-16 RX ADMIN — METRONIDAZOLE 500 MG: 500 TABLET ORAL at 01:09

## 2022-09-16 RX ADMIN — MORPHINE SULFATE 4 MG: 4 INJECTION INTRAVENOUS at 11:09

## 2022-09-16 RX ADMIN — KETOROLAC TROMETHAMINE 15 MG: 30 INJECTION, SOLUTION INTRAMUSCULAR; INTRAVENOUS at 12:09

## 2022-09-16 RX ADMIN — CIPROFLOXACIN 400 MG: 2 INJECTION, SOLUTION INTRAVENOUS at 12:09

## 2022-09-16 RX ADMIN — SODIUM CHLORIDE 1000 ML: 0.9 INJECTION, SOLUTION INTRAVENOUS at 11:09

## 2022-09-16 RX ADMIN — ONDANSETRON 4 MG: 2 INJECTION INTRAMUSCULAR; INTRAVENOUS at 11:09

## 2022-09-16 RX ADMIN — HYDROCODONE BITARTRATE AND ACETAMINOPHEN 1 TABLET: 10; 325 TABLET ORAL at 02:09

## 2022-09-16 RX ADMIN — MORPHINE SULFATE 4 MG: 4 INJECTION INTRAVENOUS at 12:09

## 2022-09-16 NOTE — ED PROVIDER NOTES
"SCRIBE #1 NOTE: I, Cookie Alvarez, am scribing for, and in the presence of, Ines Garza Do, MD. I have scribed the entire note.       History     Chief Complaint   Patient presents with    Abdominal Pain     Upper abdominal pain in both quadrants. Vomiting yesterday and feeling of fullness     Review of patient's allergies indicates:   Allergen Reactions    Prozac [fluoxetine] Other (See Comments)     Pt states she is intolerant of all antidepressants-makes her forget who or where she is    Adhesive tape-silicones Dermatitis     Skin sluffs off, burns skin  "Tegaderm burns the skin"    Powder scent fragrance Rash     Powder from gloves         History of Present Illness     HPI    9/16/2022, 11:24 AM  History obtained from the patient      History of Present Illness: Mary Jo Chan is a 51 y.o. female patient with a PMHx of anxiety with severe panic attacks, chronic fatigue, depression, disc degerneration, herniated disc, diverticulosis, IBS, IgG deficiency, and osteoarthritis who presents to the Emergency Department for evaluation of abdominal pain which onset several days PTA. Symptoms are constant and moderate in severity. Pt states she had a fever of around 102 yesterday. She had episodes of hematemesis as well, and now states she has trouble urinating. She has not had a BM this week, but states she usually goes days without having a BM.  Associated sxs include vomiting, CP, SOB. Patient denies any chills, dizziness, diaphoresis, cough, fatigue, and all other sxs at this time. No prior Tx reported. No further complaints or concerns at this time.       Arrival mode: Personal vehicle      PCP: Wali Benito MD        Past Medical History:  Past Medical History:   Diagnosis Date    Anxiety     with severe panic attacks    Chronic fatigue     Depression     Disc degeneration     Disc degeneration     Diverticulosis     Herniated disc     IBS (irritable bowel syndrome)     IgG deficiency     Multiple " sclerosis     Osteoarthritis     Other chronic sinusitis     Panic attack        Past Surgical History:  Past Surgical History:   Procedure Laterality Date    APPENDECTOMY      BLADDER SURGERY      multiple repairs after tears during ex lap    BONE MARROW ASPIRATION Right 2020    Procedure: ASPIRATION, BONE MARROW;  Surgeon: Richie Dunn DPM;  Location: [facility];  Service: Podiatry;  Laterality: Right;  FROM TIBIA     SECTION, LOW TRANSVERSE      CHOLECYSTECTOMY      CYSTOTOMY      with repair at time of     FOOT NEUROMA SURGERY Right     HYSTERECTOMY      OPEN REDUCTION AND INTERNAL FIXATION (ORIF) OF FRACTURE OF METATARSAL BONE Right 2020    Procedure: ORIF, FRACTURE, METATARSAL BONE;  Surgeon: Richie Dunn DPM;  Location: [facility];  Service: Podiatry;  Laterality: Right;    AL EXPLORATORY OF ABDOMEN      Multiple with lysis of adhesions x's 13    REMOVAL OF HARDWARE FROM LOWER EXTREMITY Right 3/15/2021    Procedure: REMOVAL, HARDWARE, LOWER EXTREMITY;  Surgeon: Richie Dunn DPM;  Location: [facility];  Service: Podiatry;  Laterality: Right;  WITH PLACEMENT OF ANTIBIOTIC BEADS    SHOULDER SURGERY      right    TYMPANOSTOMY TUBE PLACEMENT Left          Family History:  Family History   Problem Relation Age of Onset    Colon cancer Mother 30       Social History:  Social History     Tobacco Use    Smoking status: Some Days     Packs/day: 0.50     Years: 27.00     Pack years: 13.50     Types: Cigarettes    Smokeless tobacco: Never    Tobacco comments:     No smoking after m.n prior to sx   Substance and Sexual Activity    Alcohol use: Never    Drug use: No    Sexual activity: Not on file        Review of Systems     Review of Systems   Constitutional:  Negative for chills, diaphoresis, fatigue and fever.   HENT:  Negative for sore throat.    Respiratory:  Positive for shortness of breath. Negative for cough.    Cardiovascular:  Positive for chest pain.   Gastrointestinal:  Positive  for abdominal pain and vomiting. Negative for nausea.   Genitourinary:  Positive for difficulty urinating. Negative for dysuria.   Musculoskeletal:  Negative for back pain.   Skin:  Negative for rash.   Neurological:  Negative for dizziness and weakness.   Hematological:  Does not bruise/bleed easily.   All other systems reviewed and are negative.     Physical Exam     Initial Vitals [09/16/22 0929]   BP Pulse Resp Temp SpO2   (!) 130/90 86 18 97.9 °F (36.6 °C) 95 %      MAP       --          Physical Exam  Nursing Notes and Vital Signs Reviewed.  Constitutional: Patient is in mild distress. Well-developed and well-nourished.  Head: Atraumatic. Normocephalic.  Eyes: PERRL. EOM intact. Conjunctivae are not pale. No scleral icterus.  ENT: Mucous membranes are moist. Oropharynx is clear and symmetric.    Neck: Supple. Full ROM..  Cardiovascular: Regular rate. Regular rhythm. No murmurs, rubs, or gallops.   Pulmonary/Chest: No respiratory distress. Clear to auscultation bilaterally. No wheezing or rales.  Abdominal: Non-distended. but pt has Upper bilateral tenderness. No rebound, guarding, or rigidity. Good bowel sounds.  Genitourinary: No CVA tenderness  Musculoskeletal: Moves all extremities. No obvious deformities. No edema. No calf tenderness.  Skin: Warm and dry.  Neurological:  Alert, awake, and appropriate.  Normal speech.  No acute focal neurological deficits are appreciated.  Psychiatric: Normal affect. Good eye contact. Appropriate in content.     ED Course   Procedures  ED Vital Signs:  Vitals:    09/16/22 0929 09/16/22 1124 09/16/22 1205 09/16/22 1400   BP: (!) 130/90   132/85   Pulse: 86   82   Resp: 18 20 18 18   Temp: 97.9 °F (36.6 °C)   97.9 °F (36.6 °C)   TempSrc: Oral   Oral   SpO2: 95%      Weight: 90.3 kg (199 lb)       09/16/22 1455 09/16/22 1525   BP:  136/74   Pulse:  84   Resp: 18 18   Temp:  98.2 °F (36.8 °C)   TempSrc:  Oral   SpO2:     Weight:         Abnormal Lab Results:  Labs Reviewed    CBC W/ AUTO DIFFERENTIAL - Abnormal; Notable for the following components:       Result Value    MCH 32.4 (*)     Lymph # 0.8 (*)     Gran % 79.1 (*)     Lymph % 11.4 (*)     All other components within normal limits   COMPREHENSIVE METABOLIC PANEL - Abnormal; Notable for the following components:    Calcium 11.1 (*)     All other components within normal limits   URINALYSIS, REFLEX TO URINE CULTURE - Abnormal; Notable for the following components:    Appearance, UA Hazy (*)     Protein, UA Trace (*)     Occult Blood UA 2+ (*)     Leukocytes, UA Trace (*)     All other components within normal limits    Narrative:     Specimen Source->Urine   URINALYSIS MICROSCOPIC - Abnormal; Notable for the following components:    RBC, UA 9 (*)     WBC, UA 8 (*)     WBC Clumps, UA Moderate (*)     All other components within normal limits    Narrative:     Specimen Source->Urine   LIPASE   TROPONIN I   B-TYPE NATRIURETIC PEPTIDE        All Lab Results:  Results for orders placed or performed during the hospital encounter of 09/16/22   CBC auto differential   Result Value Ref Range    WBC 7.02 3.90 - 12.70 K/uL    RBC 4.11 4.00 - 5.40 M/uL    Hemoglobin 13.3 12.0 - 16.0 g/dL    Hematocrit 38.5 37.0 - 48.5 %    MCV 94 82 - 98 fL    MCH 32.4 (H) 27.0 - 31.0 pg    MCHC 34.5 32.0 - 36.0 g/dL    RDW 11.5 11.5 - 14.5 %    Platelets 244 150 - 450 K/uL    MPV 10.5 9.2 - 12.9 fL    Immature Granulocytes 0.3 0.0 - 0.5 %    Gran # (ANC) 5.6 1.8 - 7.7 K/uL    Immature Grans (Abs) 0.02 0.00 - 0.04 K/uL    Lymph # 0.8 (L) 1.0 - 4.8 K/uL    Mono # 0.6 0.3 - 1.0 K/uL    Eos # 0.0 0.0 - 0.5 K/uL    Baso # 0.04 0.00 - 0.20 K/uL    nRBC 0 0 /100 WBC    Gran % 79.1 (H) 38.0 - 73.0 %    Lymph % 11.4 (L) 18.0 - 48.0 %    Mono % 8.0 4.0 - 15.0 %    Eosinophil % 0.6 0.0 - 8.0 %    Basophil % 0.6 0.0 - 1.9 %    Differential Method Automated    Comprehensive metabolic panel   Result Value Ref Range    Sodium 138 136 - 145 mmol/L    Potassium 3.7 3.5 -  5.1 mmol/L    Chloride 100 95 - 110 mmol/L    CO2 25 23 - 29 mmol/L    Glucose 105 70 - 110 mg/dL    BUN 10 6 - 20 mg/dL    Creatinine 0.8 0.5 - 1.4 mg/dL    Calcium 11.1 (H) 8.7 - 10.5 mg/dL    Total Protein 7.9 6.0 - 8.4 g/dL    Albumin 4.2 3.5 - 5.2 g/dL    Total Bilirubin 1.0 0.1 - 1.0 mg/dL    Alkaline Phosphatase 106 55 - 135 U/L    AST 25 10 - 40 U/L    ALT 30 10 - 44 U/L    Anion Gap 13 8 - 16 mmol/L    eGFR >60 >60 mL/min/1.73 m^2   Lipase   Result Value Ref Range    Lipase 23 4 - 60 U/L   Urinalysis, Reflex to Urine Culture Urine, Clean Catch    Specimen: Urine   Result Value Ref Range    Specimen UA Urine, Clean Catch     Color, UA Yellow Yellow, Straw, Acacia    Appearance, UA Hazy (A) Clear    pH, UA 6.0 5.0 - 8.0    Specific Gravity, UA 1.025 1.005 - 1.030    Protein, UA Trace (A) Negative    Glucose, UA Negative Negative    Ketones, UA Negative Negative    Bilirubin (UA) Negative Negative    Occult Blood UA 2+ (A) Negative    Nitrite, UA Negative Negative    Urobilinogen, UA Negative <2.0 EU/dL    Leukocytes, UA Trace (A) Negative   Troponin I #1   Result Value Ref Range    Troponin I <0.006 0.000 - 0.026 ng/mL   BNP   Result Value Ref Range    BNP 41 0 - 99 pg/mL   Urinalysis Microscopic   Result Value Ref Range    RBC, UA 9 (H) 0 - 4 /hpf    WBC, UA 8 (H) 0 - 5 /hpf    WBC Clumps, UA Moderate (A) None-Rare    Squam Epithel, UA 14 /hpf    Unclass Samantha UA Occasional None-Moderate    Microscopic Comment SEE COMMENT         Imaging Results:  Imaging Results              X-Ray Chest AP Portable (Final result)  Result time 09/16/22 12:17:51      Final result by Dayron Mari MD (09/16/22 12:17:51)                   Impression:      No acute process seen.      Electronically signed by: Dayron Mari MD  Date:    09/16/2022  Time:    12:17               Narrative:    EXAMINATION:  XR CHEST AP PORTABLE    CLINICAL HISTORY:  Chest Pain;    FINDINGS:  Single view of the chest.    Comparison  03/10/2021.    Cardiac silhouette is normal.  Aorta demonstrates atherosclerotic disease.  No consolidation.  Bands of atelectasis seen at the lung bases.  The lungs demonstrate no evidence of active disease.  No evidence of pleural effusion or pneumothorax.  Bones appear intact.                                       CT Renal Stone Study ABD Pelvis WO (Final result)  Result time 09/16/22 10:34:20      Final result by Pauly Don MD (09/16/22 10:34:20)                   Impression:      Mild haziness along the course of the proximal superior mesenteric artery in the region of the pancreatic uncinate process.  Finding is nonspecific, but may correlate with vasculitis.  Mild pancreatitis is also possible, correlate with lipase.    Hepatomegaly with hepatic steatosis.      Electronically signed by: Pauly Don  Date:    09/16/2022  Time:    10:34               Narrative:    EXAMINATION:  CT RENAL STONE STUDY ABD PELVIS WO    CLINICAL HISTORY:  abdominal pain;    TECHNIQUE:  Low dose axial images, sagittal and coronal reformations were obtained from the lung bases to the pubic symphysis, Oral contrast was not administered.  All CT scans at this facility are performed using dose optimization techniques including the following: automated exposure control; adjustment of the mA and/or kV; use of iterative reconstruction technique.    COMPARISON:  CT abdomen pelvis with contrast 01/31/2020    FINDINGS:  Heart: Normal in size. No pericardial effusion.    Lung Bases: There are bibasilar bandlike densities suggestive of atelectatic change or fibrosis.    Liver: The liver is enlarged measuring 20 cm in craniocaudal dimension.  Hepatic parenchyma is diffusely hypoattenuating, compatible with hepatic steatosis.    Gallbladder: Absent.    Bile Ducts: There is mild dilatation of the extrahepatic common bile duct measuring up to 10 mm in diameter, not unexpected in this patient status post cholecystectomy.    Pancreas:  No mass or peripancreatic fat stranding.    Spleen: Unremarkable.    Adrenals: There is stable diffuse bilateral adrenal gland thickening without definite focal nodule.    Kidneys/ Ureters: No nephrolithiasis or hydronephrosis.    Bladder: No evidence of wall thickening.    Reproductive organs: Unremarkable.    GI Tract/Mesentery: No evidence of bowel obstruction or inflammation.  There is moderate volume stool in the colon, correlate for constipation.  The appendix is surgically absent.    Peritoneal Space: No ascites. No free air.    Retroperitoneum: No significant adenopathy.    Abdominal wall: There is a tiny fat containing umbilical hernia.  Partially calcified lesion over the left gluteal musculature within subcutaneous tissues may correlate with injection granuloma.    Vasculature: There is mild aortoiliac atherosclerosis without aneurysm.  There is mild haziness along the course of the proximal SMA in the region of the pancreatic uncinate process.    Bones: No acute fracture.  There is mild degenerative change of the spine.                                                The Emergency Provider reviewed the vital signs and test results, which are outlined above.     ED Discussion     1:19 PM: Discussed pt's case with Dr. Sanju Espana MD (Transplant, Hepatology, gastroenterology) who recommends to get lipase and monitor.  He did not feel that this CT scan showed any pathology specific to any organ.    3:00 PM: Re-evaluated pt. Pt is resting comfortably and is in no acute distress.  Lipase within normal limits.   is at bedside. Pt has advanced MS and he believes this is another MS flare up. He states that pt has memory loss and trouble processing with MS. Told pt to f/u with MS physician.  D/w pt all pertinent results. D/w pt any concerns expressed at this time. Told pt to continue with cipro, flagyl and pain medication. Answered all questions. Pt expresses understanding at this time.     3:27  PM: Reassessed pt at this time. Discussed with pt all pertinent ED information and results. Discussed pt dx and plan of tx. Gave pt all f/u and return to the ED instructions. All questions and concerns were addressed at this time. Pt expresses understanding of information and instructions, and is comfortable with plan to discharge. Pt is stable for discharge.    I discussed with patient and/or family/caretaker that evaluation in the ED does not suggest any emergent or life threatening medical conditions requiring immediate intervention beyond what was provided in the ED, and I believe patient is safe for discharge.  Regardless, an unremarkable evaluation in the ED does not preclude the development or presence of a serious of life threatening condition. As such, patient was instructed to return immediately for any worsening or change in current symptoms.      Medical Decision Making:   Clinical Tests:   Lab Tests: Ordered and Reviewed  Radiological Study: Ordered and Reviewed         ED Medication(s):  Medications   sodium chloride 0.9% bolus 1,000 mL (0 mLs Intravenous Stopped 9/16/22 1238)   ondansetron injection 4 mg (4 mg Intravenous Given by Other 9/16/22 1125)   morphine injection 4 mg (4 mg Intravenous Given by Other 9/16/22 1124)   ketorolac injection 15 mg (15 mg Intravenous Given by Other 9/16/22 1206)   morphine injection 4 mg (4 mg Intravenous Given by Other 9/16/22 1205)   metroNIDAZOLE tablet 500 mg (500 mg Oral Given 9/16/22 1324)   HYDROcodone-acetaminophen  mg per tablet 1 tablet (1 tablet Oral Given 9/16/22 1455)       Discharge Medication List as of 9/16/2022  3:11 PM        START taking these medications    Details   ciprofloxacin HCl (CIPRO) 500 MG tablet Take 1 tablet (500 mg total) by mouth 2 (two) times daily. for 10 days, Starting Fri 9/16/2022, Until Mon 9/26/2022, Normal      HYDROcodone-acetaminophen (NORCO) 7.5-325 mg per tablet Take 1 tablet by mouth every 4 (four) hours as needed  for Pain., Starting Fri 9/16/2022, Print      metroNIDAZOLE (FLAGYL) 500 MG tablet Take 1 tablet (500 mg total) by mouth 3 (three) times daily. for 10 days, Starting Fri 9/16/2022, Until Mon 9/26/2022, Normal      ondansetron (ZOFRAN) 4 MG tablet Take 1 tablet (4 mg total) by mouth every 6 (six) hours., Starting Fri 9/16/2022, Normal              Follow-up Information       Wali Benito MD In 1 day.    Specialty: Hospitalist  Contact information:  [address] [place] [address]  [address] [place] [id number]  [phone]                                 Scribe Attestation:   Scribe #1: I performed the above scribed service and the documentation accurately describes the services I performed. I attest to the accuracy of the note.     Attending:   Physician Attestation Statement for Scribe #1: I, Ines Garza Do, MD, personally performed the services described in this documentation, as scribed by Cookie Alvarez, in my presence, and it is both accurate and complete.           Clinical Impression       ICD-10-CM ICD-9-CM   1. Epigastric pain  R10.13 789.06   2. Chest pain  R07.9 786.50   3. Urinary tract infection with hematuria, site unspecified  N39.0 599.0    R31.9 599.70       Disposition:   Disposition: Discharged  Condition: Stable      Ines Garza Do, MD  09/16/22 1933

## 2022-09-16 NOTE — FIRST PROVIDER EVALUATION
Medical screening examination initiated.  I have conducted a focused provider triage encounter, findings are as follows:    Brief history of present illness:  51-year-old female with complaint of upper abdominal pain for the past couple days.    Vitals:    09/16/22 0929   BP: (!) 130/90   BP Location: Right arm   Patient Position: Sitting   Pulse: 86   Resp: 18   Temp: 97.9 °F (36.6 °C)   TempSrc: Oral   SpO2: 95%   Weight: 90.3 kg (199 lb)       Pertinent physical exam:  diffuse upper abdominal tenderness

## 2022-11-17 ENCOUNTER — PATIENT MESSAGE (OUTPATIENT)
Dept: NEUROLOGY | Facility: CLINIC | Age: 52
End: 2022-11-17
Payer: COMMERCIAL

## 2022-12-01 ENCOUNTER — PATIENT MESSAGE (OUTPATIENT)
Dept: PSYCHIATRY | Facility: CLINIC | Age: 52
End: 2022-12-01
Payer: COMMERCIAL

## 2023-02-20 ENCOUNTER — PATIENT MESSAGE (OUTPATIENT)
Dept: PSYCHIATRY | Facility: CLINIC | Age: 53
End: 2023-02-20
Payer: COMMERCIAL

## 2023-07-21 ENCOUNTER — PATIENT MESSAGE (OUTPATIENT)
Dept: PSYCHIATRY | Facility: CLINIC | Age: 53
End: 2023-07-21
Payer: COMMERCIAL

## 2023-09-25 ENCOUNTER — TELEPHONE (OUTPATIENT)
Dept: NEUROLOGY | Facility: CLINIC | Age: 53
End: 2023-09-25
Payer: COMMERCIAL

## 2023-09-25 NOTE — TELEPHONE ENCOUNTER
----- Message from Chelsy Wilkes RN sent at 9/25/2023 12:12 PM CDT -----  Regarding: FW: appt    ----- Message -----  From: Juliana Choi  Sent: 9/21/2023  11:29 AM CDT  To: #  Subject: appt                                             NASEEM ALMANZA is referring this pt to see you for MS    Can you plz ctc this pt to schedule appt?     The referral is in media     Thx  Juliana Choi   [facility]

## 2023-09-25 NOTE — TELEPHONE ENCOUNTER
Neuro referral, notes and MRIs in media. Msg to Savana to sched new pt with first available MS provider and to ask pt to bring CD of MRIs to appt

## 2023-09-26 ENCOUNTER — TELEPHONE (OUTPATIENT)
Dept: NEUROLOGY | Facility: CLINIC | Age: 53
End: 2023-09-26
Payer: COMMERCIAL

## 2023-09-26 NOTE — TELEPHONE ENCOUNTER
----- Message from Juliana Choi sent at 9/21/2023 11:27 AM CDT -----  Regarding: appt  NP NASEEM NICOLAS is referring this pt to see you for MS    Can you plz ctc this pt to schedule appt?     The referral is in media     Thx  Juliana Choi   [facility]

## 2023-10-12 ENCOUNTER — OFFICE VISIT (OUTPATIENT)
Dept: NEUROLOGY | Facility: CLINIC | Age: 53
End: 2023-10-12
Payer: COMMERCIAL

## 2023-10-12 DIAGNOSIS — G35 MULTIPLE SCLEROSIS: Primary | ICD-10-CM

## 2023-10-12 DIAGNOSIS — G89.29 OTHER CHRONIC PAIN: ICD-10-CM

## 2023-10-12 PROCEDURE — 99205 OFFICE O/P NEW HI 60 MIN: CPT | Mod: S$GLB,,, | Performed by: STUDENT IN AN ORGANIZED HEALTH CARE EDUCATION/TRAINING PROGRAM

## 2023-10-12 PROCEDURE — 99205 PR OFFICE/OUTPT VISIT, NEW, LEVL V, 60-74 MIN: ICD-10-PCS | Mod: S$GLB,,, | Performed by: STUDENT IN AN ORGANIZED HEALTH CARE EDUCATION/TRAINING PROGRAM

## 2023-10-12 NOTE — PROGRESS NOTES
Patient ID: [id number]  Referring Physician: Self, Aaareferral    Chief Complaint/Reason for Consult: Establishing care for MS  Subjective:     HPI:  Mary Jo Chan is a 52 y.o. RH female with chronic pain syndrome, hypogammaglobulinemia, depression/anxiety, current tobacco use, and diagnosis of multiple sclerosis. she is presenting today as a referral for Establishing care for MS.     Neurological History:  Symptom onset - 2005  MS diagnosis - ? (Dr. Mary Jo Fisher)  Relapse history - right-sided weakness and numbness, subsequently she was in a wheelchair for a while  MRI findings - one right frontal subcortical lesion, no PV or MARVA lesions. reportedly had a cord lesion as well but most recent MRI reports were normal   CSF findings - reportedly had OCB x4  Acute treatment - ?  DMT history - Tecfidera (2008 in trials until 2021 when insurance didn't cover it), vumerity (1-2 months, was getting worse), and Ocrevus (only induction dose)    2-3 weeks after Ocrevus she started getting worse cognitively, walking, appetite, weight loss, facial spasms, patchy burning pain, stumbling, falls.  She has low IG levels and is supposed to get SC IVIG but she forgets her injections very frequently.     MS ROS:   Fatigue: Yes -    Sleep Disturbance: yes, can sleep 2-3 days at a time  Bladder Dysfunction: no  Bowel Dysfunction: yes, constipation  Spasticity: yes, stiffness overall and facial spasms  Lhermitte's Sign:   Visual Symptoms:   Cognitive: Yes - progressively getting worse, more short term memory deficits, forgets the medications and bills   Mood Disorder: Yes - anxiety and depression, has Hx of nidhi on antidepressants therefore not on any medications, mood stabilizers didn't work either  Gait Disturbance: Yes -    Falls: No but stumbles a lot   Hand Dysfunction:   Pain: Yes - burning pain throughout    Tremors: Yes -    Dysphagia: Yes - needs to focus with both solids and liquids   Dysarthria:   Heat sensitivity:  "      Past Medical History:  Active Ambulatory Problems     Diagnosis Date Noted    Chronic pain 08/16/2013    Nausea and vomiting in adult patient 08/16/2013    Metatarsal stress fracture of right foot 06/29/2020    Multiple sclerosis 03/10/2021    Selective deficiency of immunoglobulin g (igg) subclasses 03/10/2021    Painful orthopaedic hardware 03/15/2021     Resolved Ambulatory Problems     Diagnosis Date Noted    No Resolved Ambulatory Problems     Past Medical History:   Diagnosis Date    Anxiety     Chronic fatigue     Depression     Disc degeneration     Disc degeneration     Diverticulosis     Herniated disc     IBS (irritable bowel syndrome)     IgG deficiency     Osteoarthritis     Other chronic sinusitis     Panic attack        Allergies:  Review of patient's allergies indicates:   Allergen Reactions    Prozac [fluoxetine] Other (See Comments)     Pt states she is intolerant of all antidepressants-makes her forget who or where she is    Adhesive tape-silicones Dermatitis     Skin sluffs off, burns skin  "Tegaderm burns the skin"    Powder scent fragrance Rash     Powder from gloves       Pertinent Family History:  Family History   Problem Relation Age of Onset    Colon cancer Mother 30     Pertinent Social History:  Social History     Socioeconomic History    Marital status:    Tobacco Use    Smoking status: Some Days     Current packs/day: 0.50     Average packs/day: 0.5 packs/day for 27.0 years (13.5 ttl pk-yrs)     Types: Cigarettes    Smokeless tobacco: Never    Tobacco comments:     No smoking after m.n prior to sx   Substance and Sexual Activity    Alcohol use: Never    Drug use: No   Social History Narrative         Medications:  Current Outpatient Medications   Medication Instructions    acetaminophen (TYLENOL) 500 mg, Oral, Every 6 hours PRN    atorvastatin (LIPITOR) 10 mg, Oral, Daily    baclofen (LIORESAL) 10 mg, Oral, 3 times daily    BELSOMRA 10 mg, Oral, Nightly    dimethyl " fumarate 240 mg, 2 times daily    ergocalciferol (ERGOCALCIFEROL) 50,000 unit Cap TAKE 1 CAPSULE BY MOUTH BY MOUTH EVERY 7 DAYS    HYDROcodone-acetaminophen (NORCO) 7.5-325 mg per tablet 1 tablet, Oral, Every 4 hours PRN    ibuprofen-famotidine (DUEXIS) 800-26.6 mg Tab 800 tablets, Oral, 3 times daily    LORazepam (ATIVAN) 1 mg, Oral, Every 6 hours PRN    meloxicam (MOBIC) 7.5 mg, Oral, Daily    memantine (NAMENDA) 10 mg, Oral, 2 times daily    ondansetron (ZOFRAN) 4 mg, Oral, Every 6 hours    ondansetron (ZOFRAN-ODT) 8 MG TbDL No dose, route, or frequency recorded.    pyridoxine HCl, vitamin B6, (VITAMIN B-6 ORAL) Oral, Daily    QUEtiapine (SEROQUEL) 100 mg, Oral, Nightly      Objective:     There were no vitals filed for this visit.     General:  Well-appearing, well-nourished, NAD, cooperative    Neurologic Exam:   Awake, alert and oriented x3  Speech spontaneous and fluent, intact comprehension.  Adequate fund of knowledge, vocabulary.    CN II - CN XII:  PERRLA. EOM intact. No Nystagmus. No ophthalmoplegia.   Facial sensation is increased on right (hypersensitive)  Facial expression is full and symmetric.   Hearing is intact bilaterally.   Palate elevates symmetrically.   SCM and Trapezius full strength bilaterally.   Tongue is midline.     Motor:  Normal bulk and tone in all four limbs.   There are no atrophy or fasciculations. No tremor.     Shoulder  Abd Shoulder Add Elbow   Flex Elbow  Ext Wrist   Flex Wrist  Ext Finger  Flex Finger  Ext Finger  Abd Finger   Add IO Opposition   Right 5 5 5 5 5 5 5 5 5 5 5 5   Left 5 5 5 5 5 5 5 5 5 5 5 5      Hip  Flex Hip  Ext Thigh   Abd Thigh  Add Knee  Flex Knee  Ext Plantar  Flex Dorsiflex   Right 5 5   5 5 5 5   Left 5 5   5 5 5 5     Sensory:  Light touch: increased tactile sense on RLE  Temperature: reduced throughout  Vibration: vibratory sense present throughout  Proprioception: proprioceptive sense present on RUE and on LUE, perception of figures on the skin  present on RUE and on LUE  Romberg is Exam: negative    DTRs:   Biceps Brachioradialis Triceps Robert Patellar Ankle Plantar   Right 2+ 2+  (--) 2+ 2+ Down   Left 2+ 2+  (--) 2+ 2+ Down     Coordination:  Finger to nose and heel to shin testing is normal bilaterally.  Rapidly alternating movement: intact    Gait:  Normal casual, heel, toe, and tandem gait.      Pertinent lab results  Lab Results   Component Value Date    MYQROYUD80MG 32 06/24/2020     Lab Results   Component Value Date    QGB68GHEW Non-reactive 07/13/2023    RPR Non-Reactive 07/05/2007    HEPBSAG Negative 03/12/2007     Lab Results   Component Value Date    TSH 1.31 05/29/2023    WBC 5.7 06/20/2023    LYMPH 0.8 (L) 09/16/2022    LYMPH 11.4 (L) 09/16/2022    RBC 4.11 09/16/2022    HGB 14.9 06/20/2023    HCT 44.9 06/20/2023    MCV 94 09/16/2022     06/20/2023     09/16/2022    K 3.7 09/16/2022    CO2 25 09/16/2022    BUN 10 09/16/2022    CREATININE 0.8 09/16/2022    CALCIUM 11.1 (H) 09/16/2022    AST 25 09/16/2022    ALT 30 09/16/2022     CSF analysis:  Not available     Pertinent imaging results    *Images personally reviewed and interpreted:    11/1/2022  MRI Brain w/wo contrast:  Bilateral frontal horn capping.  Isolated focus of T2/FLAIR hyperintense signal within right frontal lobe      MRI Cervical Spine w/wo contrast:  No intrinsic cord abnormalities    MRI Thoracic Spine w/wo contrast:  No intrinsic cord abnormalities    Other pertinent studies  None    Assessment:   Mary Jo Chan is a 52 y.o. right-handed female with chronic pain syndrome, hypogammaglobulinemia, depression/anxiety, current tobacco use, and previous diagnosis of multiple sclerosis who presents for establishing care. Review of available records shows one non-specific right frontal T2 lesion and no lesions involving corpus callosum, periventricular, or juxtacortical regions. Most recent spinal cord MRIs are also unremarkable. At this time she does not meet  criteria for neither relapsing remitting nor primary progressive MS. I don't have access to the CSF results either, reportedly positive OCBs, even though very sensitive for MS, however, it would not be sufficient for the diagnosis of MS in the absence of typical lesions. There are other demyelinating, inflammatory, and non-immune-mediated neurological conditions that may present with CSF OCBs. Today's neurological exam doesn't show any focal motor deficits or gait abnormalities, only notable for sensory changes. We will request the older records to review the diagnostic findings (including CSF results) and the thought process leading to the diagnosis of MS. We will also request the older spinal cord MRIs that were reportedly positive for a cord lesion. We will discuss after I review the old records.     1. Multiple sclerosis    2. Other chronic pain      Plan was discussed in detail with the patient, who is in agreement.  Patient is aware that she can contact with any questions, concerns, or new symptoms if needed before follow up appointment.    Time spent on this encounter: 73 minutes. This includes face to face time (obtaining history, documenting clinical information in the EMR, physical exam, discussing the plan with patient) and non-face to face time (such as preparing to see the patient (ie. Chart review, reviewing and interpreting previous labs and imaging), further EMR documentation, ordering tests, independently interpreting results and communicating results to the patient/family/caregiver, or care coordinator). Additional time was spent on reviewing outside medical records that were faxed/mailed over as well as reviewing MRI discs that were brought in to the appointment.     Disclaimer: This note was partly generated using dictation software which may occasionally result in transcription errors that are missed on review.      Consuelo Lopez MD    [facility]  10/12/2023

## 2023-10-18 ENCOUNTER — TELEPHONE (OUTPATIENT)
Dept: NEUROLOGY | Facility: CLINIC | Age: 53
End: 2023-10-18
Payer: COMMERCIAL

## 2023-10-18 NOTE — TELEPHONE ENCOUNTER
----- Message from Yamileth Plunkett RN sent at 10/18/2023  2:10 PM CDT -----  Regarding: FW: call back    ----- Message -----  From: Consuelo Lopez MD  Sent: 10/18/2023   1:24 PM CDT  To: Yamileth Plunkett RN  Subject: RE: call back                                    This must be a misunderstanding. I had requested her original diagnosis (Dr. Fisher's notes) and original MRIs. She has called the wrong facility.    ----- Message -----  From: Yamileth Plunkett RN  Sent: 10/16/2023   4:07 PM CDT  To: Consuelo Lopez MD  Subject: FW: call back                                      ----- Message -----  From: Elly Ken  Sent: 10/16/2023   3:59 PM CDT  To: [facility]  Subject: call back                                        Name of caller: April       What is the requesting detail: pt is requesting about original diagnosis and MRI. Please advise       Can the clinic reply by [facility]:       What number to call back:[phone]

## 2023-11-21 ENCOUNTER — TELEPHONE (OUTPATIENT)
Dept: NEUROLOGY | Facility: CLINIC | Age: 53
End: 2023-11-21
Payer: COMMERCIAL

## 2023-11-21 NOTE — TELEPHONE ENCOUNTER
Spoke to the pt to inform her that staff was contacting her to reschedule her appt from 11/27 but the pt stated that she did not want to reschedule her appt because she is looking for a new Neurologist that is going to treat her for MS.

## 2023-12-04 ENCOUNTER — HOSPITAL ENCOUNTER (EMERGENCY)
Facility: HOSPITAL | Age: 53
Discharge: HOME | End: 2023-12-04
Attending: EMERGENCY MEDICINE
Payer: COMMERCIAL

## 2023-12-04 VITALS
WEIGHT: 179.88 LBS | BODY MASS INDEX: 28.91 KG/M2 | HEIGHT: 66 IN | SYSTOLIC BLOOD PRESSURE: 163 MMHG | OXYGEN SATURATION: 95 % | HEART RATE: 73 BPM | RESPIRATION RATE: 19 BRPM | DIASTOLIC BLOOD PRESSURE: 93 MMHG | TEMPERATURE: 98 F

## 2023-12-04 DIAGNOSIS — F41.9 ANXIETY: ICD-10-CM

## 2023-12-04 DIAGNOSIS — R11.2 NAUSEA & VOMITING: Primary | ICD-10-CM

## 2023-12-04 DIAGNOSIS — E87.6 HYPOKALEMIA: ICD-10-CM

## 2023-12-04 LAB
ALBUMIN SERPL BCP-MCNC: 4.5 G/DL (ref 3.5–5.2)
ALP SERPL-CCNC: 104 U/L (ref 55–135)
ALT SERPL W/O P-5'-P-CCNC: 11 U/L (ref 10–44)
ANION GAP SERPL CALC-SCNC: 14 MMOL/L (ref 8–16)
AST SERPL-CCNC: 14 U/L (ref 10–40)
BACTERIA #/AREA URNS HPF: NORMAL /HPF
BASOPHILS # BLD AUTO: 0.03 K/UL (ref 0–0.2)
BASOPHILS NFR BLD: 0.4 % (ref 0–1.9)
BILIRUB SERPL-MCNC: 0.9 MG/DL (ref 0.1–1)
BILIRUB UR QL STRIP: NEGATIVE
BUN SERPL-MCNC: 9 MG/DL (ref 6–20)
CALCIUM SERPL-MCNC: 10.3 MG/DL (ref 8.7–10.5)
CHLORIDE SERPL-SCNC: 101 MMOL/L (ref 95–110)
CLARITY UR: CLEAR
CO2 SERPL-SCNC: 26 MMOL/L (ref 23–29)
COLOR UR: YELLOW
CREAT SERPL-MCNC: 0.7 MG/DL (ref 0.5–1.4)
DIFFERENTIAL METHOD: ABNORMAL
EOSINOPHIL # BLD AUTO: 0 K/UL (ref 0–0.5)
EOSINOPHIL NFR BLD: 0.1 % (ref 0–8)
ERYTHROCYTE [DISTWIDTH] IN BLOOD BY AUTOMATED COUNT: 12.1 % (ref 11.5–14.5)
EST. GFR  (NO RACE VARIABLE): >60 ML/MIN/1.73 M^2
GLUCOSE SERPL-MCNC: 115 MG/DL (ref 70–110)
GLUCOSE UR QL STRIP: NEGATIVE
HCT VFR BLD AUTO: 42.7 % (ref 37–48.5)
HGB BLD-MCNC: 15.1 G/DL (ref 12–16)
HGB UR QL STRIP: ABNORMAL
IMM GRANULOCYTES # BLD AUTO: 0.03 K/UL (ref 0–0.04)
IMM GRANULOCYTES NFR BLD AUTO: 0.4 % (ref 0–0.5)
KETONES UR QL STRIP: NEGATIVE
LEUKOCYTE ESTERASE UR QL STRIP: NEGATIVE
LIPASE SERPL-CCNC: 40 U/L (ref 4–60)
LYMPHOCYTES # BLD AUTO: 1.3 K/UL (ref 1–4.8)
LYMPHOCYTES NFR BLD: 15.7 % (ref 18–48)
MAGNESIUM SERPL-MCNC: 1.9 MG/DL (ref 1.6–2.6)
MCH RBC QN AUTO: 31.7 PG (ref 27–31)
MCHC RBC AUTO-ENTMCNC: 35.4 G/DL (ref 32–36)
MCV RBC AUTO: 90 FL (ref 82–98)
MICROSCOPIC COMMENT: NORMAL
MONOCYTES # BLD AUTO: 0.7 K/UL (ref 0.3–1)
MONOCYTES NFR BLD: 8 % (ref 4–15)
NEUTROPHILS # BLD AUTO: 6.1 K/UL (ref 1.8–7.7)
NEUTROPHILS NFR BLD: 75.4 % (ref 38–73)
NITRITE UR QL STRIP: NEGATIVE
NRBC BLD-RTO: 0 /100 WBC
PH UR STRIP: 7 [PH] (ref 5–8)
PLATELET # BLD AUTO: 254 K/UL (ref 150–450)
PMV BLD AUTO: 11.6 FL (ref 9.2–12.9)
POTASSIUM SERPL-SCNC: 3 MMOL/L (ref 3.5–5.1)
PROT SERPL-MCNC: 7.4 G/DL (ref 6–8.4)
PROT UR QL STRIP: NEGATIVE
RBC # BLD AUTO: 4.77 M/UL (ref 4–5.4)
RBC #/AREA URNS HPF: 2 /HPF (ref 0–4)
SODIUM SERPL-SCNC: 141 MMOL/L (ref 136–145)
SP GR UR STRIP: 1.01 (ref 1–1.03)
SQUAMOUS #/AREA URNS HPF: 1 /HPF
URN SPEC COLLECT METH UR: ABNORMAL
UROBILINOGEN UR STRIP-ACNC: NEGATIVE EU/DL
WBC # BLD AUTO: 8.09 K/UL (ref 3.9–12.7)
WBC #/AREA URNS HPF: 1 /HPF (ref 0–5)

## 2023-12-04 PROCEDURE — 83735 ASSAY OF MAGNESIUM: CPT | Performed by: EMERGENCY MEDICINE

## 2023-12-04 PROCEDURE — 25000003 PHARM REV CODE 250: Performed by: EMERGENCY MEDICINE

## 2023-12-04 PROCEDURE — 96361 HYDRATE IV INFUSION ADD-ON: CPT

## 2023-12-04 PROCEDURE — 83690 ASSAY OF LIPASE: CPT | Performed by: EMERGENCY MEDICINE

## 2023-12-04 PROCEDURE — 51798 US URINE CAPACITY MEASURE: CPT

## 2023-12-04 PROCEDURE — 81000 URINALYSIS NONAUTO W/SCOPE: CPT | Performed by: EMERGENCY MEDICINE

## 2023-12-04 PROCEDURE — 93010 ELECTROCARDIOGRAM REPORT: CPT | Mod: ,,, | Performed by: INTERNAL MEDICINE

## 2023-12-04 PROCEDURE — 63600175 PHARM REV CODE 636 W HCPCS: Performed by: EMERGENCY MEDICINE

## 2023-12-04 PROCEDURE — 96375 TX/PRO/DX INJ NEW DRUG ADDON: CPT

## 2023-12-04 PROCEDURE — 93010 EKG 12-LEAD: ICD-10-PCS | Mod: ,,, | Performed by: INTERNAL MEDICINE

## 2023-12-04 PROCEDURE — 96376 TX/PRO/DX INJ SAME DRUG ADON: CPT

## 2023-12-04 PROCEDURE — 99285 EMERGENCY DEPT VISIT HI MDM: CPT | Mod: 25

## 2023-12-04 PROCEDURE — 96374 THER/PROPH/DIAG INJ IV PUSH: CPT

## 2023-12-04 PROCEDURE — 93005 ELECTROCARDIOGRAM TRACING: CPT

## 2023-12-04 PROCEDURE — 80053 COMPREHEN METABOLIC PANEL: CPT | Performed by: EMERGENCY MEDICINE

## 2023-12-04 PROCEDURE — 85025 COMPLETE CBC W/AUTO DIFF WBC: CPT | Performed by: EMERGENCY MEDICINE

## 2023-12-04 RX ORDER — MORPHINE SULFATE 4 MG/ML
4 INJECTION, SOLUTION INTRAMUSCULAR; INTRAVENOUS
Status: COMPLETED | OUTPATIENT
Start: 2023-12-04 | End: 2023-12-04

## 2023-12-04 RX ORDER — METOCLOPRAMIDE HYDROCHLORIDE 5 MG/ML
10 INJECTION INTRAMUSCULAR; INTRAVENOUS
Status: COMPLETED | OUTPATIENT
Start: 2023-12-04 | End: 2023-12-04

## 2023-12-04 RX ORDER — ONDANSETRON 2 MG/ML
4 INJECTION INTRAMUSCULAR; INTRAVENOUS
Status: COMPLETED | OUTPATIENT
Start: 2023-12-04 | End: 2023-12-04

## 2023-12-04 RX ADMIN — ONDANSETRON 4 MG: 2 INJECTION INTRAMUSCULAR; INTRAVENOUS at 01:12

## 2023-12-04 RX ADMIN — MORPHINE SULFATE 4 MG: 4 INJECTION INTRAVENOUS at 01:12

## 2023-12-04 RX ADMIN — ONDANSETRON 4 MG: 2 INJECTION INTRAMUSCULAR; INTRAVENOUS at 02:12

## 2023-12-04 RX ADMIN — METOCLOPRAMIDE 10 MG: 5 INJECTION, SOLUTION INTRAMUSCULAR; INTRAVENOUS at 12:12

## 2023-12-04 RX ADMIN — SODIUM CHLORIDE 1000 ML: 9 INJECTION, SOLUTION INTRAVENOUS at 12:12
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
## 2023-12-29 ENCOUNTER — PATIENT MESSAGE (OUTPATIENT)
Dept: SLEEP MEDICINE | Facility: CLINIC | Age: 53
End: 2023-12-29
Payer: COMMERCIAL

## 2024-01-22 ENCOUNTER — PATIENT MESSAGE (OUTPATIENT)
Dept: PSYCHIATRY | Facility: CLINIC | Age: 54
End: 2024-01-22
Payer: COMMERCIAL

## 2024-05-02 ENCOUNTER — PATIENT MESSAGE (OUTPATIENT)
Dept: PSYCHIATRY | Facility: CLINIC | Age: 54
End: 2024-05-02
Payer: COMMERCIAL

## 2024-07-25 ENCOUNTER — PATIENT MESSAGE (OUTPATIENT)
Dept: PSYCHIATRY | Facility: CLINIC | Age: 54
End: 2024-07-25
Payer: COMMERCIAL

## 2024-08-05 ENCOUNTER — PATIENT MESSAGE (OUTPATIENT)
Dept: PSYCHIATRY | Facility: CLINIC | Age: 54
End: 2024-08-05
Payer: COMMERCIAL

## 2024-12-16 ENCOUNTER — PATIENT MESSAGE (OUTPATIENT)
Dept: PSYCHIATRY | Facility: CLINIC | Age: 54
End: 2024-12-16
Payer: COMMERCIAL

## 2025-03-07 ENCOUNTER — PATIENT MESSAGE (OUTPATIENT)
Dept: PSYCHIATRY | Facility: CLINIC | Age: 55
End: 2025-03-07
Payer: COMMERCIAL

## 2025-05-13 ENCOUNTER — PATIENT MESSAGE (OUTPATIENT)
Dept: PSYCHIATRY | Facility: CLINIC | Age: 55
End: 2025-05-13
Payer: COMMERCIAL

## 2025-06-19 ENCOUNTER — PATIENT MESSAGE (OUTPATIENT)
Dept: PSYCHIATRY | Facility: CLINIC | Age: 55
End: 2025-06-19
Payer: COMMERCIAL

## 2025-07-30 ENCOUNTER — PATIENT MESSAGE (OUTPATIENT)
Dept: PSYCHIATRY | Facility: CLINIC | Age: 55
End: 2025-07-30
Payer: COMMERCIAL

## 2025-08-01 ENCOUNTER — PATIENT MESSAGE (OUTPATIENT)
Dept: PSYCHIATRY | Facility: CLINIC | Age: 55
End: 2025-08-01
Payer: COMMERCIAL

## (undated) DEVICE — APPLICATOR CHLORAPREP ORN 26ML

## (undated) DEVICE — SEE MEDLINE ITEM 146231

## (undated) DEVICE — DRAPE MOBILE C-ARM

## (undated) DEVICE — SEE MEDLINE ITEM 152522

## (undated) DEVICE — 1.6 DRILL BIT

## (undated) DEVICE — SEE MEDLINE ITEM 157027

## (undated) DEVICE — PAD UNDERPAD 30X30

## (undated) DEVICE — SUT ETHILON 3-0 PSL 30 BLK

## (undated) DEVICE — SUT MONOCRYL 4.0 PS2 CP496G

## (undated) DEVICE — GLOVE SURG PLYSPHRN ORTH SZ7.5

## (undated) DEVICE — DECANTER VIAL ASEPTIC TRANSFER

## (undated) DEVICE — GLOVE SURG BIOGEL LATEX SZ 7.5

## (undated) DEVICE — SEE MEDLINE ITEM 152622

## (undated) DEVICE — COVER OVERHEAD SURG LT BLUE

## (undated) DEVICE — SEE MEDLINE ITEM 146298

## (undated) DEVICE — BANDAGE DERMACEA STRETCH 4X1IN

## (undated) DEVICE — SUT VICRYL 0 27 CT-2

## (undated) DEVICE — DRESSING XEROFORM FOIL PK 1X8

## (undated) DEVICE — NDL SAFETY 22G X 1.5 ECLIPSE

## (undated) DEVICE — GAUZE SPONGE 4X4 12PLY

## (undated) DEVICE — SPONGE DERMACEA GAUZE 4X4

## (undated) DEVICE — BLADE SURG #15 CARBON STEEL

## (undated) DEVICE — ELECTRODE REM PLYHSV RETURN 9

## (undated) DEVICE — SUT ETHILON 3/0 18IN PS-1

## (undated) DEVICE — UNDERGLOVES BIOGEL PI SIZE 8

## (undated) DEVICE — PAD CAST SPECIALIST STRL 4

## (undated) DEVICE — SEE MEDLINE ITEM 146308

## (undated) DEVICE — TOURNIQUET SB QC DP 18X4IN

## (undated) DEVICE — NDL SAFETY 25G X 1.5 ECLIPSE

## (undated) DEVICE — SEE MEDLINE ITEM 157131

## (undated) DEVICE — COVER LIGHT HANDLE 80/CA

## (undated) DEVICE — Device

## (undated) DEVICE — SYR 10CC LUER LOCK

## (undated) DEVICE — MANIFOLD 4 PORT

## (undated) DEVICE — SEE MEDLINE ITEM 146347

## (undated) DEVICE — TIP SUCTION YANKAUER

## (undated) DEVICE — SUT MONOCRYL 4-0 PS-1 UND